# Patient Record
Sex: MALE | Race: WHITE | Employment: FULL TIME | URBAN - NONMETROPOLITAN AREA
[De-identification: names, ages, dates, MRNs, and addresses within clinical notes are randomized per-mention and may not be internally consistent; named-entity substitution may affect disease eponyms.]

---

## 2017-02-04 ENCOUNTER — OFFICE VISIT (OUTPATIENT)
Dept: FAMILY MEDICINE CLINIC | Age: 47
End: 2017-02-04
Payer: COMMERCIAL

## 2017-02-04 VITALS
HEART RATE: 69 BPM | DIASTOLIC BLOOD PRESSURE: 70 MMHG | BODY MASS INDEX: 35.69 KG/M2 | OXYGEN SATURATION: 96 % | SYSTOLIC BLOOD PRESSURE: 124 MMHG | WEIGHT: 278 LBS | TEMPERATURE: 98.5 F

## 2017-02-04 DIAGNOSIS — R74.8 INCREASED LIVER ENZYMES: ICD-10-CM

## 2017-02-04 DIAGNOSIS — K76.0 FATTY LIVER: ICD-10-CM

## 2017-02-04 DIAGNOSIS — E11.9 TYPE 2 DIABETES MELLITUS WITHOUT COMPLICATION, WITHOUT LONG-TERM CURRENT USE OF INSULIN (HCC): ICD-10-CM

## 2017-02-04 DIAGNOSIS — F41.9 ANXIETY: Primary | ICD-10-CM

## 2017-02-04 PROCEDURE — 99214 OFFICE O/P EST MOD 30 MIN: CPT | Performed by: FAMILY MEDICINE

## 2017-02-04 ASSESSMENT — ENCOUNTER SYMPTOMS
EYES NEGATIVE: 1
ALLERGIC/IMMUNOLOGIC NEGATIVE: 1
GASTROINTESTINAL NEGATIVE: 1
RESPIRATORY NEGATIVE: 1

## 2017-03-20 ENCOUNTER — OFFICE VISIT (OUTPATIENT)
Dept: FAMILY MEDICINE CLINIC | Age: 47
End: 2017-03-20
Payer: COMMERCIAL

## 2017-03-20 VITALS
OXYGEN SATURATION: 97 % | WEIGHT: 275 LBS | BODY MASS INDEX: 35.31 KG/M2 | TEMPERATURE: 100.1 F | HEART RATE: 87 BPM | DIASTOLIC BLOOD PRESSURE: 62 MMHG | SYSTOLIC BLOOD PRESSURE: 120 MMHG

## 2017-03-20 DIAGNOSIS — R50.9 FEVER, UNSPECIFIED FEVER CAUSE: ICD-10-CM

## 2017-03-20 DIAGNOSIS — B34.9 VIRAL SYNDROME: Primary | ICD-10-CM

## 2017-03-20 LAB
RAPID INFLUENZA  B AGN: NEGATIVE
RAPID INFLUENZA A AGN: NEGATIVE

## 2017-03-20 PROCEDURE — 99213 OFFICE O/P EST LOW 20 MIN: CPT | Performed by: FAMILY MEDICINE

## 2017-03-20 RX ORDER — AZITHROMYCIN 250 MG/1
TABLET, FILM COATED ORAL
Qty: 1 PACKET | Refills: 0 | Status: SHIPPED | OUTPATIENT
Start: 2017-03-20 | End: 2017-03-30

## 2017-03-20 ASSESSMENT — ENCOUNTER SYMPTOMS
ALLERGIC/IMMUNOLOGIC NEGATIVE: 1
EYES NEGATIVE: 1
GASTROINTESTINAL NEGATIVE: 1
VOMITING: 0
SORE THROAT: 1
RESPIRATORY NEGATIVE: 1
NAUSEA: 0

## 2017-04-25 DIAGNOSIS — E11.9 TYPE 2 DIABETES MELLITUS WITHOUT COMPLICATION, WITHOUT LONG-TERM CURRENT USE OF INSULIN (HCC): ICD-10-CM

## 2017-08-15 ENCOUNTER — TELEPHONE (OUTPATIENT)
Dept: FAMILY MEDICINE CLINIC | Age: 47
End: 2017-08-15

## 2017-10-06 DIAGNOSIS — E11.9 DIABETES MELLITUS (HCC): ICD-10-CM

## 2017-10-07 RX ORDER — BLOOD-GLUCOSE METER
KIT MISCELLANEOUS
Qty: 100 STRIP | Refills: 0 | Status: SHIPPED | OUTPATIENT
Start: 2017-10-07 | End: 2019-10-26 | Stop reason: SDUPTHER

## 2017-12-08 DIAGNOSIS — R74.8 INCREASED LIVER ENZYMES: ICD-10-CM

## 2017-12-08 DIAGNOSIS — E11.9 TYPE 2 DIABETES MELLITUS WITHOUT COMPLICATION, WITHOUT LONG-TERM CURRENT USE OF INSULIN (HCC): ICD-10-CM

## 2017-12-08 LAB
ALBUMIN SERPL-MCNC: 4.4 G/DL (ref 3.5–5.2)
ALP BLD-CCNC: 65 U/L (ref 40–130)
ALT SERPL-CCNC: 28 U/L (ref 5–41)
ANION GAP SERPL CALCULATED.3IONS-SCNC: 13 MMOL/L (ref 7–19)
AST SERPL-CCNC: 19 U/L (ref 5–40)
BILIRUB SERPL-MCNC: 0.7 MG/DL (ref 0.2–1.2)
BUN BLDV-MCNC: 12 MG/DL (ref 6–20)
CALCIUM SERPL-MCNC: 9.2 MG/DL (ref 8.6–10)
CHLORIDE BLD-SCNC: 102 MMOL/L (ref 98–111)
CHOLESTEROL, TOTAL: 177 MG/DL (ref 160–199)
CO2: 26 MMOL/L (ref 22–29)
CREAT SERPL-MCNC: 0.8 MG/DL (ref 0.5–1.2)
GFR NON-AFRICAN AMERICAN: >60
GLUCOSE BLD-MCNC: 130 MG/DL (ref 74–109)
HBA1C MFR BLD: 6.6 %
HCT VFR BLD CALC: 46.3 % (ref 42–52)
HDLC SERPL-MCNC: 37 MG/DL (ref 55–121)
HEMOGLOBIN: 15.7 G/DL (ref 14–18)
HEPATITIS C ANTIBODY INTERPRETATION: NORMAL
LDL CHOLESTEROL CALCULATED: 109 MG/DL
MCH RBC QN AUTO: 29.7 PG (ref 27–31)
MCHC RBC AUTO-ENTMCNC: 33.9 G/DL (ref 33–37)
MCV RBC AUTO: 87.7 FL (ref 80–94)
MICROALBUMIN UR-MCNC: <1.2 MG/DL (ref 0–19)
PDW BLD-RTO: 12.9 % (ref 11.5–14.5)
PLATELET # BLD: 201 K/UL (ref 130–400)
PMV BLD AUTO: 10.5 FL (ref 9.4–12.4)
POTASSIUM SERPL-SCNC: 4.2 MMOL/L (ref 3.5–5)
RBC # BLD: 5.28 M/UL (ref 4.7–6.1)
SODIUM BLD-SCNC: 141 MMOL/L (ref 136–145)
TOTAL PROTEIN: 7 G/DL (ref 6.6–8.7)
TRIGL SERPL-MCNC: 153 MG/DL (ref 0–149)
WBC # BLD: 7.4 K/UL (ref 4.8–10.8)

## 2018-01-02 ENCOUNTER — OFFICE VISIT (OUTPATIENT)
Dept: UROLOGY | Facility: CLINIC | Age: 48
End: 2018-01-02

## 2018-01-02 VITALS — HEIGHT: 74 IN | TEMPERATURE: 98.1 F | WEIGHT: 280 LBS | BODY MASS INDEX: 35.94 KG/M2

## 2018-01-02 DIAGNOSIS — N20.1 URETERAL STONE: ICD-10-CM

## 2018-01-02 DIAGNOSIS — N13.2 HYDRONEPHROSIS WITH RENAL AND URETERAL CALCULUS OBSTRUCTION: ICD-10-CM

## 2018-01-02 DIAGNOSIS — N20.0 KIDNEY STONE: Primary | ICD-10-CM

## 2018-01-02 DIAGNOSIS — R10.9 FLANK PAIN: Primary | ICD-10-CM

## 2018-01-02 LAB
BILIRUB BLD-MCNC: NEGATIVE MG/DL
CLARITY, POC: CLEAR
COLOR UR: YELLOW
GLUCOSE UR STRIP-MCNC: NEGATIVE MG/DL
KETONES UR QL: NEGATIVE
LEUKOCYTE EST, POC: NEGATIVE
NITRITE UR-MCNC: NEGATIVE MG/ML
PH UR: 6 [PH] (ref 5–8)
PROT UR STRIP-MCNC: NEGATIVE MG/DL
RBC # UR STRIP: ABNORMAL /UL
SP GR UR: 1.02 (ref 1–1.03)
UROBILINOGEN UR QL: NORMAL

## 2018-01-02 PROCEDURE — 99214 OFFICE O/P EST MOD 30 MIN: CPT | Performed by: UROLOGY

## 2018-01-02 PROCEDURE — 81003 URINALYSIS AUTO W/O SCOPE: CPT | Performed by: UROLOGY

## 2018-01-02 RX ORDER — KETOROLAC TROMETHAMINE 10 MG/1
TABLET, FILM COATED ORAL
Refills: 0 | COMMUNITY
Start: 2017-12-29 | End: 2018-07-09

## 2018-01-02 RX ORDER — TAMSULOSIN HYDROCHLORIDE 0.4 MG/1
1 CAPSULE ORAL NIGHTLY
COMMUNITY
End: 2018-07-09 | Stop reason: ALTCHOICE

## 2018-01-02 NOTE — PROGRESS NOTES
Subjective    Mr. Castañeda is 47 y.o. male    CHIEF COMPLAINT: Kidney stone    The patient comes in today emergently over the weekend he had some severe left flank pain some nausea and vomiting also symptoms of urgency and frequency he went to the ER at [facility] where he got a CT scan which I personally reviewed this shows a small 2 at most 3 mm stone in the left UVJ with left hydronephrosis no other calculi were seen    CT independent review  The CT scan of the abdomen/pelvis done without contrast is available for me to review.  Treatment recommendations require an independent review.  First I scanned the liver, spleen, and bowel pattern.  The retroperitoneum including the major vessels and lymphatic packages are briefly reviewed.  This film as been reviewed by the radiologist to determine any non urologic abnormalities that are present.  The kidneys are closely inspected for size, symmetry, contour, parenchymal thickness, perinephric reaction, presence of calcifications, and intrarenal dilation of the collecting system.  The ureters are inspected for their course, caliber, and any calcifications.  The bladder is inspected for its thickness, size, and presence of any calcifications.  This scan shows:    The right kidney appears normal on this non-contrasted CT scan.  The renal parenchymal is norml in thickness.  There are no solid masses or cysts.  There is no hydronephrosis.  There are no stones.      The left kidney appears abnormal on this non contrasted CT scan.   Normal parenchymal thickness, No renal masses, No renal cysts, mild hydronephrosis, mild hydroureter and Ureteral stone measuring 3mm    The bladder appears normal on this non-contrasted CT scan.  The bladder appears normal in thickness.  There no masses or stones seen on this exam    Since then he is been feeling better may be still little bit of pressure sensation and some frequency but otherwise no other severe pain no fevers associated with  "this etc.      History of Present Illness      The following portions of the patient's history were reviewed and updated as appropriate: allergies, current medications, past family history, past medical history, past social history, past surgical history and problem list.    Review of Systems   Constitutional: Negative for chills and fever.   Gastrointestinal: Negative for abdominal pain, anal bleeding and blood in stool.   Genitourinary: Positive for flank pain and frequency. Negative for difficulty urinating, hematuria, testicular pain and urgency.         Current Outpatient Prescriptions:   •  metFORMIN (GLUCOPHAGE) 500 MG tablet, TAKE 1 TABLET BY MOUTH TWICE A DAY WITH MEALS, Disp: , Rfl:   •  tamsulosin (FLOMAX) 0.4 MG capsule 24 hr capsule, Take 1 capsule by mouth Every Night., Disp: , Rfl:   •  ketorolac (TORADOL) 10 MG tablet, TAKE 1 TABLET BY MOUTH EVERY 6 HOURS AS NEEDED FOR PAIN, Disp: , Rfl: 0  •  sertraline (ZOLOFT) 50 MG tablet, TAKE 1 TABLET BY MOUTH DAILY, Disp: , Rfl: 3    Past Medical History:   Diagnosis Date   • Diabetes mellitus    • Kidney stone        Past Surgical History:   Procedure Laterality Date   • CHOLECYSTECTOMY         Social History     Social History   • Marital status:      Spouse name: N/A   • Number of children: N/A   • Years of education: N/A     Social History Main Topics   • Smoking status: Never Smoker   • Smokeless tobacco: Never Used   • Alcohol use No   • Drug use: None   • Sexual activity: Not Asked     Other Topics Concern   • None     Social History Narrative   • None       Family History   Problem Relation Age of Onset   • No Known Problems Father    • Cancer Mother    • Cancer Paternal Grandfather        Objective    Temp 98.1 °F (36.7 °C)  Ht 188 cm (74\")  Wt 127 kg (280 lb)  BMI 35.95 kg/m2    Physical Exam      No results found for any previous visit.    Results for orders placed or performed in visit on 01/02/18   POC Urinalysis Dipstick, Automated "   Result Value Ref Range    Color Yellow Yellow, Straw, Dark Yellow, Lizette    Clarity, UA Clear Clear    Glucose, UA Negative Negative, 1000 mg/dL (3+) mg/dL    Bilirubin Negative Negative    Ketones, UA Negative Negative    Specific Gravity  1.020 1.005 - 1.030    Blood, UA Trace (A) Negative    pH, Urine 6.0 5.0 - 8.0    Protein, POC Negative Negative mg/dL    Urobilinogen, UA Normal Normal    Leukocytes Negative Negative    Nitrite, UA Negative Negative       Assessment and Plan    Diagnoses and all orders for this visit:    Kidney stone  -     POC Urinalysis Dipstick, Automated    Ureteral stone  -     XR Abdomen KUB; Future  -     US Renal Bilateral; Future    Hydronephrosis with renal and ureteral calculus obstruction    Plan--I discussed the options the patient he still has been tamsulosin (last one so he is started taking that he does was given some pain medication for from the ER.    I suggested observation CV can pass a stone he should be able to pass it if he starts having severe pain and fever that he will let me know otherwise were seen back in to 3 weeks the KUB and ultrasound he will call sooner when necessary

## 2018-01-23 ENCOUNTER — APPOINTMENT (OUTPATIENT)
Dept: ULTRASOUND IMAGING | Facility: HOSPITAL | Age: 48
End: 2018-01-23
Attending: UROLOGY

## 2018-07-09 ENCOUNTER — OFFICE VISIT (OUTPATIENT)
Dept: UROLOGY | Facility: CLINIC | Age: 48
End: 2018-07-09

## 2018-07-09 ENCOUNTER — HOSPITAL ENCOUNTER (OUTPATIENT)
Dept: GENERAL RADIOLOGY | Facility: HOSPITAL | Age: 48
Discharge: HOME | End: 2018-07-09
Attending: UROLOGY | Admitting: UROLOGY

## 2018-07-09 DIAGNOSIS — N20.0 KIDNEY STONE: Primary | ICD-10-CM

## 2018-07-09 DIAGNOSIS — N13.2 HYDRONEPHROSIS WITH RENAL AND URETERAL CALCULUS OBSTRUCTION: ICD-10-CM

## 2018-07-09 DIAGNOSIS — N20.1 URETERAL STONE: ICD-10-CM

## 2018-07-09 LAB
BILIRUB BLD-MCNC: NEGATIVE MG/DL
CLARITY, POC: CLEAR
COLOR UR: YELLOW
GLUCOSE UR STRIP-MCNC: NEGATIVE MG/DL
KETONES UR QL: NEGATIVE
LEUKOCYTE EST, POC: NEGATIVE
NITRITE UR-MCNC: NEGATIVE MG/ML
PH UR: 5 [PH] (ref 5–8)
PROT UR STRIP-MCNC: NEGATIVE MG/DL
RBC # UR STRIP: ABNORMAL /UL
SP GR UR: 1.02 (ref 1–1.03)
UROBILINOGEN UR QL: NORMAL

## 2018-07-09 PROCEDURE — 74018 RADEX ABDOMEN 1 VIEW: CPT

## 2018-07-09 PROCEDURE — 99214 OFFICE O/P EST MOD 30 MIN: CPT | Performed by: UROLOGY

## 2018-07-09 PROCEDURE — 81001 URINALYSIS AUTO W/SCOPE: CPT | Performed by: UROLOGY

## 2018-07-09 NOTE — PATIENT INSTRUCTIONS

## 2018-07-09 NOTE — PROGRESS NOTES
Subjective    Mr. Castañeda is 47 y.o. male    CHIEF COMPLAINT: stone    Patient comes in with a recent episode of right flank pain some nausea and vomiting he went to emergency room a CT scan was obtained which I personally reviewed this again shows a very small stone at the right UVJ with mild Hydro.    He has subsequently passed a stone which he was able to collect a brought that in for this is a second stone we were unable to collect a previous 1.    I do not see any other stones in the kidneys today but he indicated that he Forgot that he had a stone disease or drinking mL was not drinking a lot of fluids sweaty so that may precipitate this was.    He is not had any significant symptoms recently of gross hematuria flank pain burning stinging urgency frequency etc.    CT independent review  The CT scan of the abdomen/pelvis done without contrast is available for me to review.  Treatment recommendations require an independent review.  First I scanned the liver, spleen, and bowel pattern.  The retroperitoneum including the major vessels and lymphatic packages are briefly reviewed.  This film as been reviewed by the radiologist to determine any non urologic abnormalities that are present.  The kidneys are closely inspected for size, symmetry, contour, parenchymal thickness, perinephric reaction, presence of calcifications, and intrarenal dilation of the collecting system.  The ureters are inspected for their course, caliber, and any calcifications.  The bladder is inspected for its thickness, size, and presence of any calcifications.  This scan shows:    The right kidney appears abnormal on this non contrasted CT scan.   Normal parenchymal thickness, No renal masses, No renal cysts, No renal stones, mild hydronephrosis and mild hydroureter very small stone at the right UVJ    The left kidney appears normal on this non-contrasted CT scan.  The renal parenchymal is norml in thickness.  There are no solid masses or  cysts.  There is no hydronephrosis.  There are no stones.      The bladder appears normal on this non-contrasted CT scan.  The bladder appears normal in thickness.  There no masses or stones seen on this exam.      History of Present Illness      The following portions of the patient's history were reviewed and updated as appropriate: allergies, current medications, past family history, past medical history, past social history, past surgical history and problem list.    Review of Systems   Constitutional: Negative.  Negative for chills and fever.   Gastrointestinal: Negative for abdominal distention, abdominal pain, anal bleeding, blood in stool and nausea.   Genitourinary: Negative for difficulty urinating, dysuria, flank pain, frequency, hematuria and urgency.   Psychiatric/Behavioral: Negative.  Negative for agitation and confusion.         Current Outpatient Prescriptions:   •  metFORMIN (GLUCOPHAGE) 500 MG tablet, TAKE 1 TABLET BY MOUTH TWICE A DAY WITH MEALS, Disp: , Rfl:   •  sertraline (ZOLOFT) 50 MG tablet, TAKE 1 TABLET BY MOUTH DAILY, Disp: , Rfl: 3    Past Medical History:   Diagnosis Date   • Diabetes mellitus (CMS/HCC)    • Kidney stone        Past Surgical History:   Procedure Laterality Date   • CHOLECYSTECTOMY         Social History     Social History   • Marital status:      Social History Main Topics   • Smoking status: Never Smoker   • Smokeless tobacco: Never Used   • Alcohol use No   • Drug use: No   • Sexual activity: Defer     Other Topics Concern   • Not on file       Family History   Problem Relation Age of Onset   • No Known Problems Father    • Cancer Mother    • Cancer Paternal Grandfather        Objective    There were no vitals taken for this visit.    Physical Exam      Office Visit on 01/02/2018   Component Date Value Ref Range Status   • Color 01/02/2018 Yellow  Yellow, Straw, Dark Yellow, Lizette Final   • Clarity, UA 01/02/2018 Clear  Clear Final   • Glucose, UA 01/02/2018  Negative  Negative, 1000 mg/dL (3+) mg/dL Final   • Bilirubin 01/02/2018 Negative  Negative Final   • Ketones, UA 01/02/2018 Negative  Negative Final   • Specific Gravity  01/02/2018 1.020  1.005 - 1.030 Final   • Blood, UA 01/02/2018 Trace* Negative Final   • pH, Urine 01/02/2018 6.0  5.0 - 8.0 Final   • Protein, POC 01/02/2018 Negative  Negative mg/dL Final   • Urobilinogen, UA 01/02/2018 Normal  Normal Final   • Leukocytes 01/02/2018 Negative  Negative Final   • Nitrite, UA 01/02/2018 Negative  Negative Final       Results for orders placed or performed in visit on 07/09/18   POC Urinalysis Dipstick, Multipro   Result Value Ref Range    Color Yellow Yellow, Straw, Dark Yellow, Lizette    Clarity, UA Clear Clear    Glucose, UA Negative Negative, 1000 mg/dL (3+) mg/dL    Bilirubin Negative Negative    Ketones, UA Negative Negative    Specific Gravity  1.025 1.005 - 1.030    Blood, UA Small (A) Negative    pH, Urine 5.0 5.0 - 8.0    Protein, POC Negative Negative mg/dL    Urobilinogen, UA Normal Normal    Nitrite, UA Negative Negative    Leukocytes Negative Negative     Microscopic Urinalysis  I inspected the urine myself based on the clinical situation including the dipstick urine. The urine is spun in a centrifuge for three minutes. The spun urine shows 3-6 rbc/hpf, none wbc/hpf, none epi/hpf, negative bacteria, negative crystals, and negative casts.     Assessment and Plan    Horacio was seen today for flank pain.    Diagnoses and all orders for this visit:    Kidney stone  -     POC Urinalysis Dipstick, Multipro  -     XR Abdomen KUB; Future    Hydronephrosis with renal and ureteral calculus obstruction    Ureteral stone    Plan--the patient is passed a stone is now asymptomatic.    Discussed x-ray findings with him and again accordingly do not see any other calculi E.    We will go ahead and send the stone that he passed off for analysis all seen back again in about 6 weeks he would like to be seen at [place]  [facility] which I think would be fine we will check a KUB then just make sure it will see anything and then hopefully will have the stone analysis back we will discuss her dietary restrictions at that point in time.    I did discuss restrictions with him now I think of answered all his questions

## 2018-07-14 ENCOUNTER — RESULTS ENCOUNTER (OUTPATIENT)
Dept: UROLOGY | Facility: CLINIC | Age: 48
End: 2018-07-14

## 2018-07-14 DIAGNOSIS — N20.0 KIDNEY STONE: ICD-10-CM

## 2018-07-18 DIAGNOSIS — N20.0 KIDNEY STONE: Primary | ICD-10-CM

## 2018-08-03 DIAGNOSIS — E11.9 TYPE 2 DIABETES MELLITUS WITHOUT COMPLICATION, WITHOUT LONG-TERM CURRENT USE OF INSULIN (HCC): ICD-10-CM

## 2018-08-03 DIAGNOSIS — F41.9 ANXIETY: ICD-10-CM

## 2018-10-17 DIAGNOSIS — F41.9 ANXIETY: ICD-10-CM

## 2018-12-03 DIAGNOSIS — E11.9 TYPE 2 DIABETES MELLITUS WITHOUT COMPLICATION, WITHOUT LONG-TERM CURRENT USE OF INSULIN (HCC): Primary | ICD-10-CM

## 2018-12-03 DIAGNOSIS — K76.0 FATTY LIVER: ICD-10-CM

## 2019-02-23 ENCOUNTER — OFFICE VISIT (OUTPATIENT)
Dept: FAMILY MEDICINE CLINIC | Age: 49
End: 2019-02-23
Payer: COMMERCIAL

## 2019-02-23 VITALS
DIASTOLIC BLOOD PRESSURE: 70 MMHG | BODY MASS INDEX: 35.69 KG/M2 | WEIGHT: 278 LBS | HEART RATE: 74 BPM | SYSTOLIC BLOOD PRESSURE: 128 MMHG | TEMPERATURE: 98.7 F | OXYGEN SATURATION: 97 %

## 2019-02-23 DIAGNOSIS — Z30.09 ENCOUNTER FOR VASECTOMY ASSESSMENT: ICD-10-CM

## 2019-02-23 DIAGNOSIS — E11.9 TYPE 2 DIABETES MELLITUS WITHOUT COMPLICATION, WITHOUT LONG-TERM CURRENT USE OF INSULIN (HCC): Primary | ICD-10-CM

## 2019-02-23 DIAGNOSIS — M25.512 ACUTE PAIN OF LEFT SHOULDER: ICD-10-CM

## 2019-02-23 DIAGNOSIS — L91.8 SKIN TAG: ICD-10-CM

## 2019-02-23 PROCEDURE — 99214 OFFICE O/P EST MOD 30 MIN: CPT | Performed by: FAMILY MEDICINE

## 2019-02-23 ASSESSMENT — PATIENT HEALTH QUESTIONNAIRE - PHQ9
SUM OF ALL RESPONSES TO PHQ QUESTIONS 1-9: 0
2. FEELING DOWN, DEPRESSED OR HOPELESS: 0
SUM OF ALL RESPONSES TO PHQ9 QUESTIONS 1 & 2: 0
1. LITTLE INTEREST OR PLEASURE IN DOING THINGS: 0
SUM OF ALL RESPONSES TO PHQ QUESTIONS 1-9: 0

## 2019-02-23 ASSESSMENT — ENCOUNTER SYMPTOMS
ALLERGIC/IMMUNOLOGIC NEGATIVE: 1
RESPIRATORY NEGATIVE: 1
EYES NEGATIVE: 1
GASTROINTESTINAL NEGATIVE: 1

## 2019-03-13 ENCOUNTER — TELEPHONE (OUTPATIENT)
Dept: FAMILY MEDICINE CLINIC | Age: 49
End: 2019-03-13

## 2019-04-02 NOTE — PROGRESS NOTES
Mr. Castañeda is 48 y.o. male    CHIEF COMPLAINT: I am here for a vasectomy consult.    HPI  The patient has been pondering the option of a vasectomy for3 years. Anatomically this is a lower genital tract issue/procedure. With regard to context of the decision, he presently has 1 child. He is . Associated/Relevant symptoms/signs include None. He voices no additional questions about birth control options.     Patient also went discuss erectile dysfunction.  He has been having difficulty about 2 years.  He does have type 2 diabetes mellitus.  He is never tried PDE 5 inhibitors.  He is noted a significant decrease in the number of nocturnal and spontaneous erections  over the last several months.      The following portions of the patient's history were reviewed and updated as appropriate: allergies, current medications, past family history, past medical history, past social history, past surgical history and problem list.      Review of Systems   Constitutional: Negative for chills and fever.   Gastrointestinal: Negative for abdominal pain, anal bleeding and blood in stool.   Genitourinary: Negative for dysuria, frequency, hematuria and urgency.           Current Outpatient Medications:   •  metFORMIN (GLUCOPHAGE) 500 MG tablet, TAKE 1 TABLET BY MOUTH TWICE A DAY WITH MEALS, Disp: , Rfl:   •  ALPRAZolam (XANAX) 2 MG tablet, Take 1 tablet by mouth 1 (One) Time for 1 dose. Bring to office for procedure, Disp: 1 tablet, Rfl: 0  •  HYDROcodone-acetaminophen (NORCO) 7.5-325 MG per tablet, Take 1 tablet by mouth Every 6 (Six) Hours As Needed for Moderate Pain  for up to 4 days., Disp: 16 tablet, Rfl: 0  •  sertraline (ZOLOFT) 50 MG tablet, TAKE 1 TABLET BY MOUTH DAILY, Disp: , Rfl: 3  •  sildenafil (REVATIO) 20 MG tablet, Take 1 tablet by mouth 1 (One) Time As Needed (See below) for up to 1 dose. Take 1-5 tablets as needed about 1-2 hours before activity, Disp: 30 tablet, Rfl: 5    Past Medical History:   Diagnosis  "Date   • Diabetes mellitus (CMS/HCC)    • Kidney stone        Past Surgical History:   Procedure Laterality Date   • CHOLECYSTECTOMY         Social History     Socioeconomic History   • Marital status:      Spouse name: Not on file   • Number of children: Not on file   • Years of education: Not on file   • Highest education level: Not on file   Tobacco Use   • Smoking status: Never Smoker   • Smokeless tobacco: Never Used   Substance and Sexual Activity   • Alcohol use: No   • Drug use: No   • Sexual activity: Defer       Family History   Problem Relation Age of Onset   • No Known Problems Father    • Cancer Mother    • Cancer Paternal Grandfather          Temp 97.5 °F (36.4 °C)   Ht 188 cm (74\")   Wt 125 kg (275 lb)   BMI 35.31 kg/m²       Physical Exam  Constitutional: Well nourished, Well developed; No apparent distress  Psychiatric: Appropriate affect; Alert and oriented  Eyes: Unremarkable  Musculoskeletal: Normal gait and station  GI: Abdomen is soft, non-tender  Respiratory: No distress; Unlabored movement; No accessory musculature needed with symmetric movements  Skin: No pallor or diaphoresis  ; Penis and testicles are normal.  The vas deferens is palpable bilaterally and appears accessible for vasectomy.  Vitals reviewed.        Data  Results for orders placed or performed in visit on 07/09/18   POC Urinalysis Dipstick, Multipro   Result Value Ref Range    Color Yellow Yellow, Straw, Dark Yellow, Lizette    Clarity, UA Clear Clear    Glucose, UA Negative Negative, 1000 mg/dL (3+) mg/dL    Bilirubin Negative Negative    Ketones, UA Negative Negative    Specific Gravity  1.025 1.005 - 1.030    Blood, UA Small (A) Negative    pH, Urine 5.0 5.0 - 8.0    Protein, POC Negative Negative mg/dL    Urobilinogen, UA Normal Normal    Nitrite, UA Negative Negative    Leukocytes Negative Negative         Imaging Results (last 7 days)     ** No results found for the last 168 hours. **       Patient's Body mass " index is 35.31 kg/m². BMI is above normal parameters. Recommendations include: educational material.        Assessment and Plan  Diagnoses and all orders for this visit:    Visit for vasectomy evaluation  -     Vasectomy; Future  -     ALPRAZolam (XANAX) 2 MG tablet; Take 1 tablet by mouth 1 (One) Time for 1 dose. Bring to office for procedure  -     Semen Analysis, Post-vasectomy - Semen, Voided; Future  -     HYDROcodone-acetaminophen (NORCO) 7.5-325 MG per tablet; Take 1 tablet by mouth Every 6 (Six) Hours As Needed for Moderate Pain  for up to 4 days.    Erectile dysfunction, unspecified erectile dysfunction type  -     sildenafil (REVATIO) 20 MG tablet; Take 1 tablet by mouth 1 (One) Time As Needed (See below) for up to 1 dose. Take 1-5 tablets as needed about 1-2 hours before activity    The patient desires vasectomy.  Risks of this procedure are discussed.  We discussed that it does take up to 6 months for a patient to clear the proximal sperm through the process of ejaculation.  It is explained that postoperative specimens are essential before consideration of birth control cessation.  Risks of bleeding, infection, sperm granuloma, testicular pain that can become prolonged such as post vasectomy neuralgia, recanalization with resumption of fertility status, testicular atrophy are included in the discussion.  The patient is made aware of other birth control options including permanent sterilization procedures for females.  It is also explained the vasectomy does not reduce the risk of sexually transmitted disease.  Discussion of the use of preprocedural benzodiazepine and postoperative opiate narcotic relief is also undertaken.  Brief addiction assessment concluded.  The patient has consented to the procedure.    The patient and I discussed the likely etiology of his impotence. We discussed diagnostic evaluation for erectile dysfunction is possible but usually does not change the management. He understands  "that goal directed therapy is probably the best approach since no matter what the etiology, a PDE 5 inhibitor is the least invasive way to manage ED. While there are risks to PDE 5 inhibitors which I discussed as described below, it was relayed to him that they probably provide the most \"natural\" erection. Other options discussed included penile injections, urethral suppositories, vacuum erection device without or without the tension ring, and penile prosthesis. The risks of headache, visual changes, hypotension, priapism, GI distress, myalgias, and the contraindication of nitrates were discussed with the patient. The patient denies a history of nitrates either in long acting or prn use form. He denies a history of MI, heart failure, or stroke in the last 6 months. I discussed with him that alpha blockers should be taken at least 4 hours apart from the PDE 5 inhibitors. He was given a prescription for sildenafil 20 mg tablets.He is told that he can take up to five of these titrated for effect but to use the lowest dose necessary.   I reminded him that facial flushing is not usual with this medication, and that it is most effective when taken on an empty stomach without alcohol. He understands that he needs to be forthright about using this medication in any medical situation, especially when giving nitrates is being considered.          (Please note that portions of this note were completed with a voice recognition program.)  Didier Grove MD  04/05/19  10:15 AM      Cc: Bessie Machado, CARLA  "

## 2019-04-05 ENCOUNTER — OFFICE VISIT (OUTPATIENT)
Dept: UROLOGY | Facility: CLINIC | Age: 49
End: 2019-04-05

## 2019-04-05 VITALS — TEMPERATURE: 97.5 F | HEIGHT: 74 IN | WEIGHT: 275 LBS | BODY MASS INDEX: 35.29 KG/M2

## 2019-04-05 DIAGNOSIS — E11.9 TYPE 2 DIABETES MELLITUS WITHOUT COMPLICATION, WITHOUT LONG-TERM CURRENT USE OF INSULIN (HCC): ICD-10-CM

## 2019-04-05 DIAGNOSIS — Z30.09 VISIT FOR VASECTOMY EVALUATION: Primary | ICD-10-CM

## 2019-04-05 DIAGNOSIS — N52.9 ERECTILE DYSFUNCTION, UNSPECIFIED ERECTILE DYSFUNCTION TYPE: ICD-10-CM

## 2019-04-05 LAB
ALBUMIN SERPL-MCNC: 4.4 G/DL (ref 3.5–5.2)
ALP BLD-CCNC: 81 U/L (ref 40–130)
ALT SERPL-CCNC: 37 U/L (ref 5–41)
ANION GAP SERPL CALCULATED.3IONS-SCNC: 13 MMOL/L (ref 7–19)
AST SERPL-CCNC: 23 U/L (ref 5–40)
BILIRUB SERPL-MCNC: 1.1 MG/DL (ref 0.2–1.2)
BILIRUBIN URINE: NEGATIVE
BLOOD, URINE: NEGATIVE
BUN BLDV-MCNC: 12 MG/DL (ref 6–20)
CALCIUM SERPL-MCNC: 9.5 MG/DL (ref 8.6–10)
CHLORIDE BLD-SCNC: 98 MMOL/L (ref 98–111)
CHOLESTEROL, TOTAL: 172 MG/DL (ref 160–199)
CLARITY: CLEAR
CO2: 27 MMOL/L (ref 22–29)
COLOR: ABNORMAL
CREAT SERPL-MCNC: 0.8 MG/DL (ref 0.5–1.2)
GFR NON-AFRICAN AMERICAN: >60
GLUCOSE BLD-MCNC: 139 MG/DL (ref 74–109)
GLUCOSE URINE: NEGATIVE MG/DL
HBA1C MFR BLD: 7.5 % (ref 4–6)
HCT VFR BLD CALC: 46.3 % (ref 42–52)
HDLC SERPL-MCNC: 37 MG/DL (ref 55–121)
HEMOGLOBIN: 15.3 G/DL (ref 14–18)
KETONES, URINE: NEGATIVE MG/DL
LDL CHOLESTEROL CALCULATED: 104 MG/DL
LEUKOCYTE ESTERASE, URINE: NEGATIVE
MCH RBC QN AUTO: 28.8 PG (ref 27–31)
MCHC RBC AUTO-ENTMCNC: 33 G/DL (ref 33–37)
MCV RBC AUTO: 87.2 FL (ref 80–94)
MICROALBUMIN UR-MCNC: <1.2 MG/DL (ref 0–19)
NITRITE, URINE: NEGATIVE
PDW BLD-RTO: 12.9 % (ref 11.5–14.5)
PH UA: 5.5 (ref 5–8)
PLATELET # BLD: 212 K/UL (ref 130–400)
PMV BLD AUTO: 10.8 FL (ref 9.4–12.4)
POTASSIUM SERPL-SCNC: 4.1 MMOL/L (ref 3.5–5)
PROTEIN UA: ABNORMAL MG/DL
RBC # BLD: 5.31 M/UL (ref 4.7–6.1)
SODIUM BLD-SCNC: 138 MMOL/L (ref 136–145)
SPECIFIC GRAVITY UA: 1.03 (ref 1–1.03)
TOTAL PROTEIN: 7.3 G/DL (ref 6.6–8.7)
TRIGL SERPL-MCNC: 153 MG/DL (ref 0–149)
UROBILINOGEN, URINE: 0.2 E.U./DL
WBC # BLD: 8.3 K/UL (ref 4.8–10.8)

## 2019-04-05 PROCEDURE — 99214 OFFICE O/P EST MOD 30 MIN: CPT | Performed by: UROLOGY

## 2019-04-05 RX ORDER — ALPRAZOLAM 2 MG/1
2 TABLET ORAL ONCE
Qty: 1 TABLET | Refills: 0 | Status: SHIPPED | OUTPATIENT
Start: 2019-04-05 | End: 2019-04-05

## 2019-04-05 RX ORDER — HYDROCODONE BITARTRATE AND ACETAMINOPHEN 7.5; 325 MG/1; MG/1
1 TABLET ORAL EVERY 6 HOURS PRN
Qty: 16 TABLET | Refills: 0 | Status: SHIPPED | OUTPATIENT
Start: 2019-04-05 | End: 2019-04-09

## 2019-04-05 RX ORDER — SILDENAFIL CITRATE 20 MG/1
20 TABLET ORAL ONCE AS NEEDED
Qty: 30 TABLET | Refills: 5 | Status: SHIPPED | OUTPATIENT
Start: 2019-04-05 | End: 2020-06-29 | Stop reason: SDUPTHER

## 2019-04-05 NOTE — PATIENT INSTRUCTIONS

## 2019-04-17 ENCOUNTER — TELEPHONE (OUTPATIENT)
Dept: UROLOGY | Facility: CLINIC | Age: 49
End: 2019-04-17

## 2019-04-17 NOTE — TELEPHONE ENCOUNTER
Pt had called /vm,  I called him back, he needs to r/s vas, but will not be able to r/s until Aug,   He will call in June with dates hes able to do the VAS.

## 2019-04-24 ENCOUNTER — OFFICE VISIT (OUTPATIENT)
Dept: FAMILY MEDICINE CLINIC | Age: 49
End: 2019-04-24
Payer: COMMERCIAL

## 2019-04-24 VITALS
TEMPERATURE: 98.4 F | DIASTOLIC BLOOD PRESSURE: 88 MMHG | HEIGHT: 73 IN | RESPIRATION RATE: 16 BRPM | HEART RATE: 74 BPM | SYSTOLIC BLOOD PRESSURE: 138 MMHG | BODY MASS INDEX: 38.17 KG/M2 | OXYGEN SATURATION: 98 % | WEIGHT: 288 LBS

## 2019-04-24 DIAGNOSIS — E11.9 TYPE 2 DIABETES MELLITUS WITHOUT COMPLICATION, WITHOUT LONG-TERM CURRENT USE OF INSULIN (HCC): Primary | ICD-10-CM

## 2019-04-24 DIAGNOSIS — I10 ESSENTIAL HYPERTENSION: ICD-10-CM

## 2019-04-24 DIAGNOSIS — E78.2 MIXED HYPERLIPIDEMIA: ICD-10-CM

## 2019-04-24 PROCEDURE — 99214 OFFICE O/P EST MOD 30 MIN: CPT | Performed by: FAMILY MEDICINE

## 2019-04-24 RX ORDER — ROSUVASTATIN CALCIUM 5 MG/1
5 TABLET, COATED ORAL DAILY
Qty: 90 TABLET | Refills: 3 | Status: SHIPPED | OUTPATIENT
Start: 2019-04-24 | End: 2019-10-26 | Stop reason: ALTCHOICE

## 2019-04-24 RX ORDER — BLOOD-GLUCOSE METER
1 KIT MISCELLANEOUS DAILY
Qty: 1 KIT | Refills: 0 | Status: SHIPPED | OUTPATIENT
Start: 2019-04-24

## 2019-04-24 ASSESSMENT — ENCOUNTER SYMPTOMS
GASTROINTESTINAL NEGATIVE: 1
ALLERGIC/IMMUNOLOGIC NEGATIVE: 1
RESPIRATORY NEGATIVE: 1
EYES NEGATIVE: 1

## 2019-04-24 NOTE — PROGRESS NOTES
Negative. Musculoskeletal: Negative. Skin: Negative. Allergic/Immunologic: Negative. Neurological: Negative. Hematological: Negative. Psychiatric/Behavioral: Negative. OBJECTIVE:    Physical Exam   Constitutional: He is oriented to person, place, and time. He appears well-developed and well-nourished. HENT:   Head: Normocephalic and atraumatic. Right Ear: External ear normal.   Left Ear: External ear normal.   Nose: Nose normal.   Mouth/Throat: Oropharynx is clear and moist.   Eyes: Pupils are equal, round, and reactive to light. Conjunctivae and EOM are normal.   Neck: Normal range of motion. Neck supple. Cardiovascular: Normal rate, regular rhythm, S1 normal, S2 normal, normal heart sounds, intact distal pulses and normal pulses. Pulmonary/Chest: Effort normal and breath sounds normal. No apnea. Abdominal: Soft. Normal appearance. Musculoskeletal: Normal range of motion. Neurological: He is alert and oriented to person, place, and time. He has normal strength and normal reflexes. Skin: Skin is warm, dry and intact. Psychiatric: He has a normal mood and affect. His speech is normal and behavior is normal. Judgment and thought content normal. Cognition and memory are normal.   Vitals reviewed. BP (!) 148/84   Pulse 74   Temp 98.4 °F (36.9 °C) (Oral)   Resp 16   Ht 6' 1\" (1.854 m)   Wt 288 lb (130.6 kg)   SpO2 98%   BMI 38.00 kg/m²      ASSESSMENT:     Diagnosis Orders   1. Type 2 diabetes mellitus without complication, without long-term current use of insulin (HCC) -not control  metFORMIN (GLUCOPHAGE) 1000 MG tablet    CBC    Comprehensive Metabolic Panel    Hemoglobin A1C    Lipid Panel    MICROALBUMIN, RANDOM URINE (W/O CREATININE)    Urinalysis    glucose monitoring kit (FREESTYLE) monitoring kit   2. Mixed hyperlipidemia -not control  rosuvastatin (CRESTOR) 5 MG tablet   3. Essential hypertension -borderline          PLAN:    1.  Blood work discussed with

## 2019-10-01 DIAGNOSIS — E11.9 TYPE 2 DIABETES MELLITUS WITHOUT COMPLICATION, WITHOUT LONG-TERM CURRENT USE OF INSULIN (HCC): ICD-10-CM

## 2019-10-01 LAB
ALBUMIN SERPL-MCNC: 4.3 G/DL (ref 3.5–5.2)
ALP BLD-CCNC: 98 U/L (ref 40–130)
ALT SERPL-CCNC: 52 U/L (ref 5–41)
ANION GAP SERPL CALCULATED.3IONS-SCNC: 16 MMOL/L (ref 7–19)
AST SERPL-CCNC: 34 U/L (ref 5–40)
BILIRUB SERPL-MCNC: 1.2 MG/DL (ref 0.2–1.2)
BILIRUBIN URINE: NEGATIVE
BLOOD, URINE: NEGATIVE
BUN BLDV-MCNC: 11 MG/DL (ref 6–20)
CALCIUM SERPL-MCNC: 9.1 MG/DL (ref 8.6–10)
CHLORIDE BLD-SCNC: 100 MMOL/L (ref 98–111)
CHOLESTEROL, TOTAL: 178 MG/DL (ref 160–199)
CLARITY: CLEAR
CO2: 22 MMOL/L (ref 22–29)
COLOR: ABNORMAL
CREAT SERPL-MCNC: 0.7 MG/DL (ref 0.5–1.2)
GFR NON-AFRICAN AMERICAN: >60
GLUCOSE BLD-MCNC: 259 MG/DL (ref 74–109)
GLUCOSE URINE: >=1000 MG/DL
HBA1C MFR BLD: 9.6 % (ref 4–6)
HCT VFR BLD CALC: 48 % (ref 42–52)
HDLC SERPL-MCNC: 38 MG/DL (ref 55–121)
HEMOGLOBIN: 16 G/DL (ref 14–18)
KETONES, URINE: NEGATIVE MG/DL
LDL CHOLESTEROL CALCULATED: 112 MG/DL
LEUKOCYTE ESTERASE, URINE: NEGATIVE
MCH RBC QN AUTO: 29.2 PG (ref 27–31)
MCHC RBC AUTO-ENTMCNC: 33.3 G/DL (ref 33–37)
MCV RBC AUTO: 87.6 FL (ref 80–94)
MICROALBUMIN UR-MCNC: 2 MG/DL (ref 0–19)
NITRITE, URINE: NEGATIVE
PDW BLD-RTO: 12.5 % (ref 11.5–14.5)
PH UA: 6 (ref 5–8)
PLATELET # BLD: 203 K/UL (ref 130–400)
PMV BLD AUTO: 11.1 FL (ref 9.4–12.4)
POTASSIUM SERPL-SCNC: 4 MMOL/L (ref 3.5–5)
PROTEIN UA: NEGATIVE MG/DL
RBC # BLD: 5.48 M/UL (ref 4.7–6.1)
SODIUM BLD-SCNC: 138 MMOL/L (ref 136–145)
SPECIFIC GRAVITY UA: 1.03 (ref 1–1.03)
TOTAL PROTEIN: 7.3 G/DL (ref 6.6–8.7)
TRIGL SERPL-MCNC: 141 MG/DL (ref 0–149)
UROBILINOGEN, URINE: 0.2 E.U./DL
WBC # BLD: 6.5 K/UL (ref 4.8–10.8)

## 2019-10-26 ENCOUNTER — OFFICE VISIT (OUTPATIENT)
Dept: FAMILY MEDICINE CLINIC | Age: 49
End: 2019-10-26
Payer: COMMERCIAL

## 2019-10-26 VITALS
DIASTOLIC BLOOD PRESSURE: 78 MMHG | HEART RATE: 65 BPM | OXYGEN SATURATION: 98 % | BODY MASS INDEX: 36.41 KG/M2 | WEIGHT: 276 LBS | SYSTOLIC BLOOD PRESSURE: 132 MMHG | TEMPERATURE: 97.7 F

## 2019-10-26 DIAGNOSIS — E78.2 MIXED HYPERLIPIDEMIA: ICD-10-CM

## 2019-10-26 DIAGNOSIS — Z28.21 REFUSED INFLUENZA VACCINE: ICD-10-CM

## 2019-10-26 DIAGNOSIS — E11.9 TYPE 2 DIABETES MELLITUS WITHOUT COMPLICATION, WITHOUT LONG-TERM CURRENT USE OF INSULIN (HCC): Primary | ICD-10-CM

## 2019-10-26 DIAGNOSIS — F41.9 ANXIETY: ICD-10-CM

## 2019-10-26 PROCEDURE — 99214 OFFICE O/P EST MOD 30 MIN: CPT | Performed by: FAMILY MEDICINE

## 2019-10-26 RX ORDER — METFORMIN HYDROCHLORIDE 500 MG/1
500 TABLET, EXTENDED RELEASE ORAL
Qty: 30 TABLET | Refills: 5 | Status: SHIPPED | OUTPATIENT
Start: 2019-10-26 | End: 2020-03-20

## 2019-10-26 ASSESSMENT — ENCOUNTER SYMPTOMS
EYES NEGATIVE: 1
ALLERGIC/IMMUNOLOGIC NEGATIVE: 1
RESPIRATORY NEGATIVE: 1
GASTROINTESTINAL NEGATIVE: 1

## 2019-11-04 ENCOUNTER — TELEPHONE (OUTPATIENT)
Dept: FAMILY MEDICINE CLINIC | Age: 49
End: 2019-11-04

## 2019-11-17 DIAGNOSIS — E11.9 TYPE 2 DIABETES MELLITUS WITHOUT COMPLICATION, WITHOUT LONG-TERM CURRENT USE OF INSULIN (HCC): ICD-10-CM

## 2019-11-18 RX ORDER — DULAGLUTIDE 0.75 MG/.5ML
INJECTION, SOLUTION SUBCUTANEOUS
Qty: 2 PEN | Refills: 5 | Status: SHIPPED | OUTPATIENT
Start: 2019-11-18 | End: 2019-12-11 | Stop reason: SDUPTHER

## 2019-12-11 DIAGNOSIS — E11.9 TYPE 2 DIABETES MELLITUS WITHOUT COMPLICATION, WITHOUT LONG-TERM CURRENT USE OF INSULIN (HCC): ICD-10-CM

## 2020-02-07 DIAGNOSIS — E11.9 TYPE 2 DIABETES MELLITUS WITHOUT COMPLICATION, WITHOUT LONG-TERM CURRENT USE OF INSULIN (HCC): ICD-10-CM

## 2020-02-07 LAB
ALBUMIN SERPL-MCNC: 4.4 G/DL (ref 3.5–5.2)
ALP BLD-CCNC: 84 U/L (ref 40–130)
ALT SERPL-CCNC: 22 U/L (ref 5–41)
ANION GAP SERPL CALCULATED.3IONS-SCNC: 15 MMOL/L (ref 7–19)
AST SERPL-CCNC: 17 U/L (ref 5–40)
BILIRUB SERPL-MCNC: 0.7 MG/DL (ref 0.2–1.2)
BILIRUBIN URINE: NEGATIVE
BLOOD, URINE: NEGATIVE
BUN BLDV-MCNC: 12 MG/DL (ref 6–20)
CALCIUM SERPL-MCNC: 9.3 MG/DL (ref 8.6–10)
CHLORIDE BLD-SCNC: 100 MMOL/L (ref 98–111)
CHOLESTEROL, TOTAL: 164 MG/DL (ref 160–199)
CLARITY: CLEAR
CO2: 25 MMOL/L (ref 22–29)
COLOR: YELLOW
CREAT SERPL-MCNC: 0.8 MG/DL (ref 0.5–1.2)
GFR NON-AFRICAN AMERICAN: >60
GLUCOSE BLD-MCNC: 144 MG/DL (ref 74–109)
GLUCOSE URINE: NEGATIVE MG/DL
HBA1C MFR BLD: 7.1 % (ref 4–6)
HCT VFR BLD CALC: 49 % (ref 42–52)
HDLC SERPL-MCNC: 41 MG/DL (ref 55–121)
HEMOGLOBIN: 16.2 G/DL (ref 14–18)
KETONES, URINE: NEGATIVE MG/DL
LDL CHOLESTEROL CALCULATED: 103 MG/DL
LEUKOCYTE ESTERASE, URINE: NEGATIVE
MCH RBC QN AUTO: 28.2 PG (ref 27–31)
MCHC RBC AUTO-ENTMCNC: 33.1 G/DL (ref 33–37)
MCV RBC AUTO: 85.4 FL (ref 80–94)
MICROALBUMIN UR-MCNC: <1.2 MG/DL (ref 0–19)
NITRITE, URINE: NEGATIVE
PDW BLD-RTO: 12.7 % (ref 11.5–14.5)
PH UA: 6 (ref 5–8)
PLATELET # BLD: 216 K/UL (ref 130–400)
PMV BLD AUTO: 10.9 FL (ref 9.4–12.4)
POTASSIUM SERPL-SCNC: 3.8 MMOL/L (ref 3.5–5)
PROTEIN UA: NEGATIVE MG/DL
RBC # BLD: 5.74 M/UL (ref 4.7–6.1)
SODIUM BLD-SCNC: 140 MMOL/L (ref 136–145)
SPECIFIC GRAVITY UA: 1.02 (ref 1–1.03)
TOTAL PROTEIN: 7.2 G/DL (ref 6.6–8.7)
TRIGL SERPL-MCNC: 98 MG/DL (ref 0–149)
UROBILINOGEN, URINE: 0.2 E.U./DL
WBC # BLD: 6.5 K/UL (ref 4.8–10.8)

## 2020-03-20 RX ORDER — METFORMIN HYDROCHLORIDE 500 MG/1
TABLET, EXTENDED RELEASE ORAL
Qty: 90 TABLET | Refills: 1 | Status: SHIPPED | OUTPATIENT
Start: 2020-03-20 | End: 2020-09-28

## 2020-05-27 ENCOUNTER — TELEPHONE (OUTPATIENT)
Dept: GASTROENTEROLOGY | Facility: CLINIC | Age: 50
End: 2020-05-27

## 2020-05-29 ENCOUNTER — PREP FOR SURGERY (OUTPATIENT)
Dept: OTHER | Facility: HOSPITAL | Age: 50
End: 2020-05-29

## 2020-05-29 DIAGNOSIS — Z80.0 FAMILY HISTORY OF COLORECTAL CANCER: Primary | ICD-10-CM

## 2020-06-17 PROBLEM — Z80.0 FAMILY HISTORY OF COLORECTAL CANCER: Status: ACTIVE | Noted: 2020-06-17

## 2020-06-29 ENCOUNTER — TELEPHONE (OUTPATIENT)
Dept: UROLOGY | Facility: CLINIC | Age: 50
End: 2020-06-29

## 2020-06-29 DIAGNOSIS — N52.9 ERECTILE DYSFUNCTION, UNSPECIFIED ERECTILE DYSFUNCTION TYPE: ICD-10-CM

## 2020-06-29 RX ORDER — SILDENAFIL CITRATE 20 MG/1
20 TABLET ORAL ONCE AS NEEDED
Qty: 30 TABLET | Refills: 5 | Status: SHIPPED | OUTPATIENT
Start: 2020-06-29 | End: 2020-10-23

## 2020-06-29 NOTE — TELEPHONE ENCOUNTER
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 You did see him 4/2019 for a VAS evaluation and E.D. Please advise?    ----- Message -----  From: Fatuma Ruiz  Sent: 6/29/2020  11:34 AM CDT  To: Graciela Tom MA  Subject: [facility] med refill                                This pt called wanting a med refill he hasn't been here in over a year so i'll call and get him scheduled for an apt it's for his ed meds should I keep him with [facility] or move him to [place]?

## 2020-06-29 NOTE — TELEPHONE ENCOUNTER
The patient requested refill on his sildenafil 20 mg tablets.  I went ahead and refill these and sent them to his pharmacy.   Didier Grove MD  6/29/2020  16:44

## 2020-06-30 NOTE — TELEPHONE ENCOUNTER
Tried to call pt to notify him that his refill had been sent to [facility] in [place] but mailbox is full and unable to leave message.

## 2020-08-10 ENCOUNTER — TELEPHONE (OUTPATIENT)
Dept: GASTROENTEROLOGY | Facility: CLINIC | Age: 50
End: 2020-08-10

## 2020-08-10 NOTE — TELEPHONE ENCOUNTER
PT called and wanted to reschedule his procedure after the first of the year.  I told him I didn't have the schedule made out for next year yet.   I told PT we could put him in recall for January to be notified.    PT isn't having any issues.  Work is really busy.

## 2020-08-31 ENCOUNTER — VIRTUAL VISIT (OUTPATIENT)
Dept: FAMILY MEDICINE CLINIC | Age: 50
End: 2020-08-31
Payer: COMMERCIAL

## 2020-08-31 PROCEDURE — 99214 OFFICE O/P EST MOD 30 MIN: CPT | Performed by: FAMILY MEDICINE

## 2020-08-31 PROCEDURE — 3051F HG A1C>EQUAL 7.0%<8.0%: CPT | Performed by: FAMILY MEDICINE

## 2020-08-31 RX ORDER — SYRING-NEEDL,DISP,INSUL,0.3 ML 30 GX5/16"
1 SYRINGE, EMPTY DISPOSABLE MISCELLANEOUS ONCE
Qty: 100 DEVICE | Refills: 0 | Status: SHIPPED | OUTPATIENT
Start: 2020-08-31 | End: 2020-08-31

## 2020-08-31 RX ORDER — BLOOD-GLUCOSE METER
1 KIT MISCELLANEOUS DAILY
Qty: 1 KIT | Refills: 0 | Status: SHIPPED | OUTPATIENT
Start: 2020-08-31 | End: 2021-05-14 | Stop reason: SDUPTHER

## 2020-08-31 ASSESSMENT — ENCOUNTER SYMPTOMS
EYES NEGATIVE: 1
RESPIRATORY NEGATIVE: 1
ALLERGIC/IMMUNOLOGIC NEGATIVE: 1
GASTROINTESTINAL NEGATIVE: 1

## 2020-08-31 NOTE — PROGRESS NOTES
2020    TELEHEALTH EVALUATION -- Audio/Visual (During MCS- public health emergency)    HPI: Patient here for follow-up of multiple medical problems  Patient is a 50-year-old white male. He is on oral medications and Trulicity. His metformin was decreased last time he was here. Decreasing the dose of metformin improve his diarrhea. He have not been checking his blood sugars because he does not have a glucometer no more. No weight loss with a GLP-1. He does not take statin therapy. His LDL slightly elevated when the last blood work. He will be willing to try statin again. He stopped taking the medication in the past secondary to cost.  He also complained of right elbow pain. He has been having this for a while now. Pain is localized to the right elbow in the lateral sides. He used to play baseball. Denies any injuries. He also was sick last February. He has symptoms consistent with COVID-19. He works all around a lot of people. He want to get tested for COVID-19 antibiotics. Huey Funes (:  1970) has requested an audio/video evaluation for the following concern(s):    Follow-up in diabetes hyperlipidemia evaluation of right elbow pain and concerns about COVID-19 exposure    Review of Systems   Constitutional: Negative. HENT: Negative. Eyes: Negative. Respiratory: Negative. Cardiovascular: Negative. Gastrointestinal: Negative. Endocrine: Negative. Genitourinary: Negative. Musculoskeletal: Positive for arthralgias. Skin: Negative. Allergic/Immunologic: Negative. Neurological: Negative. Hematological: Negative. Psychiatric/Behavioral: Negative. Prior to Visit Medications    Medication Sig Taking?  Authorizing Provider   glucose monitoring kit (FREESTYLE) monitoring kit 1 kit by Does not apply route daily Yes Santiago Lao MD   Lancet Device MISC 1 Device by Does not apply route once for 1 dose Yes Santiago Lao MD   blood glucose test strips (ASCENSIA AUTODISC VI;ONE TOUCH ULTRA TEST VI) strip 1 each by In Vitro route daily As needed. Yes Mason Thornton MD   diclofenac sodium (VOLTAREN) 1 % GEL Apply 2 g topically 2 times daily Yes Mason Thornton MD   metFORMIN (GLUCOPHAGE-XR) 500 MG extended release tablet TAKE 1 TABLET BY MOUTH [address] Yes Mason Thornton MD   Dulaglutide (TRULICITY) 2.50 YL/6.0MS SOPN INJECT 0.75 MG INTO THE SKIN ONCE A WEEK Yes Mason Thornton MD   blood glucose test strips (FREESTYLE LITE) strip USE AS DIRECTED Yes Mason Thornton MD   Dulaglutide (TRULICITY) 1.5 PU/1.3WS SOPN Inject 1.5 mg into the skin once a week Yes Mason Thornton MD   glucose monitoring kit (FREESTYLE) monitoring kit 1 kit by Does not apply route daily DX: Diabetes  Dispense lancet and supplies  Mason Thornton MD   Lancets MISC 1 each by Does not apply route daily Dispense brand per insurance benefits and patient request.  DEVIN Noble   glucose monitoring kit (FREESTYLE) monitoring kit 1 kit by Does not apply route daily as needed DX: Diabetes  DEVIN Noble   azelastine (ASTELIN) 0.1 % nasal spray 2 sprays by Nasal route 2 times daily Use in each nostril as directed  DEVIN Noble   Multiple Vitamin (MULTIVITAMINS PO) Take 1 tablet by mouth daily. Historical Provider, MD       Social History     Tobacco Use    Smoking status: Never Smoker    Smokeless tobacco: Never Used   Substance Use Topics    Alcohol use: No    Drug use: No            PHYSICAL EXAMINATION:  [ INSTRUCTIONS:  \"[x]\" Indicates a positive item  \"[]\" Indicates a negative item  -- DELETE ALL ITEMS NOT EXAMINED]  Vital Signs: (As obtained by patient/caregiver or practitioner observation)    Blood pressure-  Heart rate-    Respiratory rate-    Temperature-  Pulse oximetry-   Unable to obtain vital signs.   Constitutional: [x] Appears well-developed and well-nourished [] No apparent distress      [] Abnormal-   Mental status  [x] Alert and awake  [] Oriented to person/place/time []Able to follow commands      Eyes:  EOM    [x]  Normal  [] Abnormal-  Sclera  []  Normal  [] Abnormal -         Discharge []  None visible  [] Abnormal -    HENT:   [x] Normocephalic, atraumatic. [] Abnormal   [] Mouth/Throat: Mucous membranes are moist.     External Ears [x] Normal  [] Abnormal-     Neck: [x] No visualized mass     Pulmonary/Chest: [x] Respiratory effort normal.  [] No visualized signs of difficulty breathing or respiratory distress        [] Abnormal-      Musculoskeletal:   [x] Normal gait with no signs of ataxia         [] Normal range of motion of neck        [] Abnormal-       Neurological:        [x] No Facial Asymmetry (Cranial nerve 7 motor function) (limited exam to video visit)          [] No gaze palsy        [] Abnormal-         Skin:        [x] No significant exanthematous lesions or discoloration noted on facial skin         [] Abnormal-            Psychiatric:       [x] Normal Affect [] No Hallucinations        [] Abnormal-     Other pertinent observable physical exam findings-     ASSESSMENT/PLAN:  1. Type 2 diabetes mellitus without complication, without long-term current use of insulin ([facility] [address]- need blood work  - CBC; Future  - Comprehensive Metabolic Panel; Future  - Hemoglobin A1C; Future  - Lipid Panel; Future  - MICROALBUMIN, RANDOM URINE (W/O CREATININE); Future  - Urinalysis; Future  - glucose monitoring kit (FREESTYLE) monitoring kit; 1 kit by Does not apply route daily  Dispense: 1 kit; Refill: 0  - Lancet Device MISC; 1 Device by Does not apply route once for 1 dose  Dispense: 100 Device; Refill: 0  - blood glucose test strips (ASCENSIA AUTODISC VI;ONE TOUCH ULTRA TEST VI) strip; 1 each by In Vitro route daily As needed. Dispense: 100 each; Refill: 3    2. Mixed hyperlipidemia  -May need statin therapy    3. Right elbow pain-suspect tennis elbow  - diclofenac sodium (VOLTAREN) 1 % GEL; Apply 2 g topically 2 times daily  Dispense: 1 Tube; Refill: 3    4.  Exposure to COVID-19 virus  - Covid-19, Antibody, Total; Future      Return in about 3 months (around 11/30/2020). Nito Prieto is a 47 y.o. male being evaluated by a Virtual Visit (video visit) encounter to address concerns as mentioned above. A caregiver was present when appropriate. Due to this being a TeleHealth encounter (During KZE-98 public health emergency), evaluation of the following organ systems was limited: Vitals/Constitutional/EENT/Resp/CV/GI//MS/Neuro/Skin/Heme-Lymph-Imm. Pursuant to the emergency declaration under the [address] [place] [address], [address] [place] [address] authority and the [employer] Act, this Virtual Visit was conducted with patient's (and/or legal guardian's) consent, to reduce the patient's risk of exposure to COVID-19 and provide necessary medical care. The patient (and/or legal guardian) has also been advised to contact this office for worsening conditions or problems, and seek emergency medical treatment and/or call 911 if deemed necessary. Patient identification was verified at the start of the visit: Yes    Total time spent on this encounter: 22    Services were provided through a video synchronous discussion virtually to substitute for in-person clinic visit. Patient and provider were located at their individual homes. --Javier Murphy MD on 8/31/2020 at 9:56 AM    An electronic signature was used to authenticate this note.

## 2020-09-04 DIAGNOSIS — E11.9 TYPE 2 DIABETES MELLITUS WITHOUT COMPLICATION, WITHOUT LONG-TERM CURRENT USE OF INSULIN (HCC): ICD-10-CM

## 2020-09-04 DIAGNOSIS — Z20.822 EXPOSURE TO COVID-19 VIRUS: ICD-10-CM

## 2020-09-04 LAB
ALBUMIN SERPL-MCNC: 4.4 G/DL (ref 3.5–5.2)
ALP BLD-CCNC: 81 U/L (ref 40–130)
ALT SERPL-CCNC: 50 U/L (ref 5–41)
ANION GAP SERPL CALCULATED.3IONS-SCNC: 13 MMOL/L (ref 7–19)
AST SERPL-CCNC: 27 U/L (ref 5–40)
BILIRUB SERPL-MCNC: 0.9 MG/DL (ref 0.2–1.2)
BILIRUBIN URINE: NEGATIVE
BLOOD, URINE: NEGATIVE
BUN BLDV-MCNC: 11 MG/DL (ref 6–20)
CALCIUM SERPL-MCNC: 9.6 MG/DL (ref 8.6–10)
CHLORIDE BLD-SCNC: 100 MMOL/L (ref 98–111)
CHOLESTEROL, TOTAL: 173 MG/DL (ref 160–199)
CLARITY: CLEAR
CO2: 28 MMOL/L (ref 22–29)
COLOR: NORMAL
CREAT SERPL-MCNC: 0.8 MG/DL (ref 0.5–1.2)
GFR AFRICAN AMERICAN: >59
GFR NON-AFRICAN AMERICAN: >60
GLUCOSE BLD-MCNC: 178 MG/DL (ref 74–109)
GLUCOSE URINE: NEGATIVE MG/DL
HBA1C MFR BLD: 7.7 % (ref 4–6)
HCT VFR BLD CALC: 49.3 % (ref 42–52)
HDLC SERPL-MCNC: 35 MG/DL (ref 55–121)
HEMOGLOBIN: 16.4 G/DL (ref 14–18)
KETONES, URINE: NEGATIVE MG/DL
LDL CHOLESTEROL CALCULATED: 104 MG/DL
LEUKOCYTE ESTERASE, URINE: NEGATIVE
MCH RBC QN AUTO: 29.3 PG (ref 27–31)
MCHC RBC AUTO-ENTMCNC: 33.3 G/DL (ref 33–37)
MCV RBC AUTO: 88.2 FL (ref 80–94)
MICROALBUMIN UR-MCNC: <1.2 MG/DL (ref 0–19)
NITRITE, URINE: NEGATIVE
PDW BLD-RTO: 13.1 % (ref 11.5–14.5)
PH UA: 6.5 (ref 5–8)
PLATELET # BLD: 205 K/UL (ref 130–400)
PMV BLD AUTO: 10.7 FL (ref 9.4–12.4)
POTASSIUM SERPL-SCNC: 4.2 MMOL/L (ref 3.5–5)
PROTEIN UA: NEGATIVE MG/DL
RBC # BLD: 5.59 M/UL (ref 4.7–6.1)
SODIUM BLD-SCNC: 141 MMOL/L (ref 136–145)
SPECIFIC GRAVITY UA: 1.02 (ref 1–1.03)
TOTAL PROTEIN: 7.1 G/DL (ref 6.6–8.7)
TRIGL SERPL-MCNC: 169 MG/DL (ref 0–149)
UROBILINOGEN, URINE: 0.2 E.U./DL
WBC # BLD: 7.2 K/UL (ref 4.8–10.8)

## 2020-09-28 RX ORDER — METFORMIN HYDROCHLORIDE 500 MG/1
TABLET, EXTENDED RELEASE ORAL
Qty: 90 TABLET | Refills: 1 | Status: SHIPPED | OUTPATIENT
Start: 2020-09-28 | End: 2021-04-06

## 2020-10-23 DIAGNOSIS — N52.9 ERECTILE DYSFUNCTION, UNSPECIFIED ERECTILE DYSFUNCTION TYPE: ICD-10-CM

## 2020-10-23 RX ORDER — SILDENAFIL CITRATE 20 MG/1
TABLET ORAL
Qty: 90 TABLET | Refills: 5 | Status: SHIPPED | OUTPATIENT
Start: 2020-10-23

## 2020-10-23 NOTE — TELEPHONE ENCOUNTER
Requested Prescriptions     Pending Prescriptions Disp Refills   • sildenafil (REVATIO) 20 MG tablet [Pharmacy Med Name: SILDENAFIL 20 MG TABLET] 90 tablet 1     Sig: TAKE 1-5 TABLETS AS NEEDED ABOUT 1-2 HOURS BEFORE ACTIVITY

## 2020-12-10 ENCOUNTER — TELEPHONE (OUTPATIENT)
Dept: GASTROENTEROLOGY | Facility: CLINIC | Age: 50
End: 2020-12-10

## 2021-01-01 DIAGNOSIS — E11.9 TYPE 2 DIABETES MELLITUS WITHOUT COMPLICATION, WITHOUT LONG-TERM CURRENT USE OF INSULIN (HCC): ICD-10-CM

## 2021-01-04 RX ORDER — DULAGLUTIDE 0.75 MG/.5ML
INJECTION, SOLUTION SUBCUTANEOUS
Qty: 2 PEN | Refills: 1 | Status: SHIPPED | OUTPATIENT
Start: 2021-01-04 | End: 2021-03-08

## 2021-03-02 ENCOUNTER — TRANSCRIBE ORDERS (OUTPATIENT)
Dept: LAB | Facility: HOSPITAL | Age: 51
End: 2021-03-02

## 2021-03-02 DIAGNOSIS — Z01.818 PREOP TESTING: Primary | ICD-10-CM

## 2021-03-05 ENCOUNTER — LAB (OUTPATIENT)
Dept: LAB | Facility: HOSPITAL | Age: 51
End: 2021-03-05

## 2021-03-05 PROCEDURE — U0004 COV-19 TEST NON-CDC HGH THRU: HCPCS | Performed by: INTERNAL MEDICINE

## 2021-03-05 PROCEDURE — C9803 HOPD COVID-19 SPEC COLLECT: HCPCS | Performed by: INTERNAL MEDICINE

## 2021-03-07 DIAGNOSIS — E11.9 TYPE 2 DIABETES MELLITUS WITHOUT COMPLICATION, WITHOUT LONG-TERM CURRENT USE OF INSULIN (HCC): ICD-10-CM

## 2021-03-07 LAB — SARS-COV-2 ORF1AB RESP QL NAA+PROBE: NOT DETECTED

## 2021-03-08 ENCOUNTER — ANESTHESIA (OUTPATIENT)
Dept: GASTROENTEROLOGY | Facility: HOSPITAL | Age: 51
End: 2021-03-08

## 2021-03-08 ENCOUNTER — HOSPITAL ENCOUNTER (OUTPATIENT)
Facility: HOSPITAL | Age: 51
Setting detail: HOSPITAL OUTPATIENT SURGERY
Discharge: HOME | End: 2021-03-08
Attending: INTERNAL MEDICINE | Admitting: INTERNAL MEDICINE

## 2021-03-08 ENCOUNTER — ANESTHESIA EVENT (OUTPATIENT)
Dept: GASTROENTEROLOGY | Facility: HOSPITAL | Age: 51
End: 2021-03-08

## 2021-03-08 VITALS
BODY MASS INDEX: 35.42 KG/M2 | HEIGHT: 74 IN | DIASTOLIC BLOOD PRESSURE: 86 MMHG | OXYGEN SATURATION: 97 % | WEIGHT: 276 LBS | TEMPERATURE: 97.1 F | SYSTOLIC BLOOD PRESSURE: 135 MMHG | HEART RATE: 71 BPM | RESPIRATION RATE: 14 BRPM

## 2021-03-08 DIAGNOSIS — Z80.0 FAMILY HISTORY OF COLORECTAL CANCER: ICD-10-CM

## 2021-03-08 PROCEDURE — 88305 TISSUE EXAM BY PATHOLOGIST: CPT | Performed by: INTERNAL MEDICINE

## 2021-03-08 PROCEDURE — 25010000002 PROPOFOL 10 MG/ML EMULSION: Performed by: NURSE ANESTHETIST, CERTIFIED REGISTERED

## 2021-03-08 PROCEDURE — 45385 COLONOSCOPY W/LESION REMOVAL: CPT | Performed by: INTERNAL MEDICINE

## 2021-03-08 RX ORDER — ONDANSETRON 2 MG/ML
4 INJECTION INTRAMUSCULAR; INTRAVENOUS ONCE AS NEEDED
Status: DISCONTINUED | OUTPATIENT
Start: 2021-03-08 | End: 2021-03-08 | Stop reason: HOSPADM

## 2021-03-08 RX ORDER — SODIUM CHLORIDE 9 MG/ML
500 INJECTION, SOLUTION INTRAVENOUS CONTINUOUS PRN
Status: DISCONTINUED | OUTPATIENT
Start: 2021-03-08 | End: 2021-03-08 | Stop reason: HOSPADM

## 2021-03-08 RX ORDER — PROPOFOL 10 MG/ML
VIAL (ML) INTRAVENOUS AS NEEDED
Status: DISCONTINUED | OUTPATIENT
Start: 2021-03-08 | End: 2021-03-08 | Stop reason: SURG

## 2021-03-08 RX ORDER — DULAGLUTIDE 0.75 MG/.5ML
INJECTION, SOLUTION SUBCUTANEOUS
Qty: 2 PEN | Refills: 5 | Status: SHIPPED | OUTPATIENT
Start: 2021-03-08 | End: 2021-09-13

## 2021-03-08 RX ORDER — SODIUM CHLORIDE 0.9 % (FLUSH) 0.9 %
10 SYRINGE (ML) INJECTION AS NEEDED
Status: DISCONTINUED | OUTPATIENT
Start: 2021-03-08 | End: 2021-03-08 | Stop reason: HOSPADM

## 2021-03-08 RX ADMIN — PROPOFOL 230 MG: 10 INJECTION, EMULSION INTRAVENOUS at 07:52

## 2021-03-08 RX ADMIN — SODIUM CHLORIDE 500 ML: 9 INJECTION, SOLUTION INTRAVENOUS at 07:20

## 2021-03-08 NOTE — H&P
"[facility] Gastroenterology  Pre Procedure History & Physical    Chief Complaint:   Screening    Subjective     HPI:   Screening    Past Medical History:   Past Medical History:   Diagnosis Date   • Diabetes mellitus (CMS/HCC)    • Kidney stone        Past Surgical History:  Past Surgical History:   Procedure Laterality Date   • CHOLECYSTECTOMY         Family History:  Family History   Problem Relation Age of Onset   • No Known Problems Father    • Cancer Mother    • Cancer Paternal Grandfather        Social History:   reports that he has never smoked. He has never used smokeless tobacco. He reports that he does not drink alcohol and does not use drugs.    Medications:   Prior to Admission medications    Medication Sig Start Date End Date Taking? Authorizing Provider   Dulaglutide (TRULICITY SC) Inject  under the skin into the appropriate area as directed.   Yes Vianey Sandoval MD   metFORMIN (GLUCOPHAGE) 500 MG tablet TAKE 1 TABLET BY MOUTH TWICE A DAY WITH MEALS 5/22/17   Vianey Sandoval MD   sildenafil (REVATIO) 20 MG tablet TAKE 1-5 TABLETS AS NEEDED ABOUT 1-2 HOURS BEFORE ACTIVITY 10/23/20   Didier Grove MD   sertraline (ZOLOFT) 50 MG tablet TAKE 1 TABLET BY MOUTH DAILY 12/8/17 3/8/21  Vianey Sandoval MD       Allergies:  Penicillins    ROS:    General: Weight stable  Resp: No SOA  Cardiovascular: No CP    Objective     Blood pressure 157/90, temperature 97.1 °F (36.2 °C), temperature source Temporal, resp. rate 18, height 188 cm (74\"), weight 125 kg (276 lb), SpO2 96 %.    Physical Exam   Constitutional: Pt is oriented to person, place, and in no distress.   Cardiovascular: Normal rate, regular rhythm.    Pulmonary/Chest: Effort normal. No respiratory distress.   Abdominal: Non-distended.  Psychiatric: Mood, memory, affect and judgment appear normal.     Assessment/Plan     Diagnosis:  Screening    Anticipated Surgical Procedure:  Colonoscopy    The risks, benefits, and alternatives " of this procedure have been discussed with the patient or the responsible party- the patient understands and agrees to proceed.    EMR Dragon/transcription disclaimer:  Much of this encounter note is electronic transcription/translation of spoken language to printed text.  The electronic translation of spoken language may be erroneous, or at times, nonsensical words or phrases may be inadvertently transcribed.  Although I have reviewed the note for such errors, some may still exist.

## 2021-03-08 NOTE — ANESTHESIA PREPROCEDURE EVALUATION
Anesthesia Evaluation     Patient summary reviewed   no history of anesthetic complications:  NPO Solid Status: > 8 hours  NPO Liquid Status: > 2 hours           Airway   Mallampati: II  TM distance: >3 FB  Neck ROM: full  No difficulty expected  Dental - normal exam     Pulmonary - negative pulmonary ROS   Cardiovascular   Exercise tolerance: good (4-7 METS)    (+) hypertension (no medications),       Neuro/Psych- negative ROS  GI/Hepatic/Renal/Endo    (+) obesity,   renal disease stones, diabetes mellitus,   (-) liver disease    Musculoskeletal     Abdominal    Substance History      OB/GYN          Other                        Anesthesia Plan    ASA 2     MAC     intravenous induction     Anesthetic plan, all risks, benefits, and alternatives have been provided, discussed and informed consent has been obtained with: patient.

## 2021-03-09 LAB
CYTO UR: NORMAL
LAB AP CASE REPORT: NORMAL
PATH REPORT.FINAL DX SPEC: NORMAL
PATH REPORT.GROSS SPEC: NORMAL

## 2021-04-06 DIAGNOSIS — E11.9 TYPE 2 DIABETES MELLITUS WITHOUT COMPLICATION, WITHOUT LONG-TERM CURRENT USE OF INSULIN (HCC): ICD-10-CM

## 2021-04-06 RX ORDER — METFORMIN HYDROCHLORIDE 500 MG/1
TABLET, EXTENDED RELEASE ORAL
Qty: 90 TABLET | Refills: 1 | Status: SHIPPED | OUTPATIENT
Start: 2021-04-06 | End: 2021-10-04

## 2021-05-14 DIAGNOSIS — E11.9 TYPE 2 DIABETES MELLITUS WITHOUT COMPLICATION, WITHOUT LONG-TERM CURRENT USE OF INSULIN (HCC): ICD-10-CM

## 2021-05-14 RX ORDER — BLOOD-GLUCOSE METER
1 KIT MISCELLANEOUS DAILY
Qty: 1 KIT | Refills: 0 | Status: SHIPPED | OUTPATIENT
Start: 2021-05-14

## 2021-05-14 RX ORDER — LANCETS 30 GAUGE
1 EACH MISCELLANEOUS DAILY
Qty: 100 EACH | Refills: 0 | Status: SHIPPED | OUTPATIENT
Start: 2021-05-14

## 2021-07-16 ENCOUNTER — OFFICE VISIT (OUTPATIENT)
Dept: FAMILY MEDICINE CLINIC | Age: 51
End: 2021-07-16
Payer: COMMERCIAL

## 2021-07-16 VITALS
BODY MASS INDEX: 36.58 KG/M2 | SYSTOLIC BLOOD PRESSURE: 138 MMHG | DIASTOLIC BLOOD PRESSURE: 82 MMHG | WEIGHT: 276 LBS | HEIGHT: 73 IN | OXYGEN SATURATION: 98 % | RESPIRATION RATE: 20 BRPM | TEMPERATURE: 97.6 F | HEART RATE: 73 BPM

## 2021-07-16 DIAGNOSIS — Z11.59 SCREENING FOR VIRAL DISEASE: ICD-10-CM

## 2021-07-16 DIAGNOSIS — H66.92 ACUTE LEFT OTITIS MEDIA: Primary | ICD-10-CM

## 2021-07-16 DIAGNOSIS — Z20.822 EXPOSURE TO COVID-19 VIRUS: ICD-10-CM

## 2021-07-16 LAB — SARS-COV-2, PCR: NOT DETECTED

## 2021-07-16 PROCEDURE — 99214 OFFICE O/P EST MOD 30 MIN: CPT | Performed by: NURSE PRACTITIONER

## 2021-07-16 RX ORDER — ALBUTEROL SULFATE 90 UG/1
2 AEROSOL, METERED RESPIRATORY (INHALATION) 4 TIMES DAILY PRN
Qty: 1 INHALER | Refills: 1 | Status: SHIPPED | OUTPATIENT
Start: 2021-07-16 | End: 2021-07-29 | Stop reason: SDUPTHER

## 2021-07-16 RX ORDER — AZITHROMYCIN 250 MG/1
250 TABLET, FILM COATED ORAL SEE ADMIN INSTRUCTIONS
Qty: 6 TABLET | Refills: 0 | Status: SHIPPED | OUTPATIENT
Start: 2021-07-16 | End: 2021-07-21

## 2021-07-16 RX ORDER — BENZONATATE 200 MG/1
200 CAPSULE ORAL 3 TIMES DAILY PRN
Qty: 30 CAPSULE | Refills: 0 | Status: SHIPPED | OUTPATIENT
Start: 2021-07-16 | End: 2021-07-23

## 2021-07-16 SDOH — ECONOMIC STABILITY: TRANSPORTATION INSECURITY
IN THE PAST 12 MONTHS, HAS LACK OF TRANSPORTATION KEPT YOU FROM MEETINGS, WORK, OR FROM GETTING THINGS NEEDED FOR DAILY LIVING?: NO

## 2021-07-16 SDOH — ECONOMIC STABILITY: FOOD INSECURITY: WITHIN THE PAST 12 MONTHS, YOU WORRIED THAT YOUR FOOD WOULD RUN OUT BEFORE YOU GOT MONEY TO BUY MORE.: NEVER TRUE

## 2021-07-16 SDOH — ECONOMIC STABILITY: FOOD INSECURITY: WITHIN THE PAST 12 MONTHS, THE FOOD YOU BOUGHT JUST DIDN'T LAST AND YOU DIDN'T HAVE MONEY TO GET MORE.: NEVER TRUE

## 2021-07-16 SDOH — ECONOMIC STABILITY: TRANSPORTATION INSECURITY
IN THE PAST 12 MONTHS, HAS THE LACK OF TRANSPORTATION KEPT YOU FROM MEDICAL APPOINTMENTS OR FROM GETTING MEDICATIONS?: NO

## 2021-07-16 ASSESSMENT — SOCIAL DETERMINANTS OF HEALTH (SDOH): HOW HARD IS IT FOR YOU TO PAY FOR THE VERY BASICS LIKE FOOD, HOUSING, MEDICAL CARE, AND HEATING?: NOT HARD AT ALL

## 2021-07-16 ASSESSMENT — ENCOUNTER SYMPTOMS
COUGH: 1
SHORTNESS OF BREATH: 0
VOICE CHANGE: 1
VOMITING: 0

## 2021-07-16 ASSESSMENT — PATIENT HEALTH QUESTIONNAIRE - PHQ9
SUM OF ALL RESPONSES TO PHQ QUESTIONS 1-9: 0
SUM OF ALL RESPONSES TO PHQ QUESTIONS 1-9: 0
2. FEELING DOWN, DEPRESSED OR HOPELESS: 0
1. LITTLE INTEREST OR PLEASURE IN DOING THINGS: 0
SUM OF ALL RESPONSES TO PHQ QUESTIONS 1-9: 0
SUM OF ALL RESPONSES TO PHQ9 QUESTIONS 1 & 2: 0

## 2021-07-16 NOTE — PROGRESS NOTES
Does not apply route daily Yes DEVIN Noble   metFORMIN (GLUCOPHAGE-XR) 500 MG extended release tablet TAKE 1 TABLET BY MOUTH EVERY DAY WITH BREAKFAST Yes Colton Esparza MD   TRULICITY 0.80 KN/6.0DV SOPN INJECT 0.75 MG INTO THE SKIN ONCE A WEEK Yes Colton Esparza MD   diclofenac sodium (VOLTAREN) 1 % GEL Apply 2 g topically 2 times daily Yes Colton Esparza MD   Dulaglutide (TRULICITY) 1.5 RB/1.2EM SOPN Inject 1.5 mg into the skin once a week Yes Colton Esparza MD   glucose monitoring kit (FREESTYLE) monitoring kit 1 kit by Does not apply route daily DX: Diabetes  Dispense lancet and supplies Yes Colton Esparza MD   Lancets MISC 1 each by Does not apply route daily Dispense brand per insurance benefits and patient request. Yes DEVIN Noble   glucose monitoring kit (FREESTYLE) monitoring kit 1 kit by Does not apply route daily as needed DX: Diabetes Yes DEVIN Noble   azelastine (ASTELIN) 0.1 % nasal spray 2 sprays by Nasal route 2 times daily Use in each nostril as directed Yes DEVIN Noble   Multiple Vitamin (MULTIVITAMINS PO) Take 1 tablet by mouth daily.  Yes Historical Provider, MD   Lancet Device MISC 1 Device by Does not apply route once for 1 dose  Colton Esparza MD     Allergies   Allergen Reactions    Penicillins Hives     Past Surgical History:   Procedure Laterality Date    CHOLECYSTECTOMY       Family History   Problem Relation Age of Onset    Cancer Mother         colon     Other Father         kidney stone      Social History     Socioeconomic History    Marital status:      Spouse name: Not on file    Number of children: Not on file    Years of education: Not on file    Highest education level: Not on file   Occupational History    Not on file   Tobacco Use    Smoking status: Never Smoker    Smokeless tobacco: Never Used   Substance and Sexual Activity    Alcohol use: No    Drug use: No    Sexual activity: Yes     Partners: Female   Other Topics Concern    Not on file   Social called or called with further info. ER if any sudden worsening. Work note thru Monday  Start tx today  Recheck BP--f/u with PCP r/t BP and glucose in 2wks. Total Time-36mins. Diagnosis and orders for this visit are above. Please note that this chart was generated using dragon dictationsoftware. Although every effort was made to ensure the accuracy of this automated transcription, some errors in transcription may have occurred.

## 2021-07-16 NOTE — LETTER
[facility] AT [facility]  [address] [address]  Phone: [phone]  Fax: [phone]    DEVIN Donato        July 16, 2021     Patient: Lin Moran   YOB: 1970   Date of Visit: 7/16/2021       To Whom it May Concern:    Lin Moran was seen in my clinic on 7/16/2021. He may return to work on 07/20/2021. If you have any questions or concerns, please don't hesitate to call.     Sincerely,         DEVIN Donato

## 2021-07-23 ENCOUNTER — NURSE ONLY (OUTPATIENT)
Dept: FAMILY MEDICINE CLINIC | Age: 51
End: 2021-07-23

## 2021-07-23 DIAGNOSIS — Z20.822 EXPOSURE TO COVID-19 VIRUS: Primary | ICD-10-CM

## 2021-07-23 PROCEDURE — 99999 PR OFFICE/OUTPT VISIT,PROCEDURE ONLY: CPT | Performed by: NURSE PRACTITIONER

## 2021-07-24 LAB — SARS-COV-2, PCR: NOT DETECTED

## 2021-07-29 ENCOUNTER — HOSPITAL ENCOUNTER (OUTPATIENT)
Dept: GENERAL RADIOLOGY | Age: 51
Discharge: HOME | End: 2021-07-29
Payer: COMMERCIAL

## 2021-07-29 ENCOUNTER — OFFICE VISIT (OUTPATIENT)
Dept: FAMILY MEDICINE CLINIC | Age: 51
End: 2021-07-29
Payer: COMMERCIAL

## 2021-07-29 VITALS
RESPIRATION RATE: 20 BRPM | TEMPERATURE: 98.7 F | HEART RATE: 83 BPM | SYSTOLIC BLOOD PRESSURE: 124 MMHG | OXYGEN SATURATION: 94 % | DIASTOLIC BLOOD PRESSURE: 86 MMHG

## 2021-07-29 DIAGNOSIS — R05.9 COUGH: Primary | ICD-10-CM

## 2021-07-29 DIAGNOSIS — R05.9 COUGH: ICD-10-CM

## 2021-07-29 DIAGNOSIS — Z20.822 EXPOSURE TO COVID-19 VIRUS: ICD-10-CM

## 2021-07-29 PROCEDURE — 71046 X-RAY EXAM CHEST 2 VIEWS: CPT

## 2021-07-29 PROCEDURE — 99213 OFFICE O/P EST LOW 20 MIN: CPT | Performed by: NURSE PRACTITIONER

## 2021-07-29 RX ORDER — PREDNISONE 20 MG/1
20 TABLET ORAL 2 TIMES DAILY
Qty: 10 TABLET | Refills: 0 | Status: SHIPPED | OUTPATIENT
Start: 2021-07-29 | End: 2021-08-03

## 2021-07-29 RX ORDER — ALBUTEROL SULFATE 90 UG/1
2 AEROSOL, METERED RESPIRATORY (INHALATION) 4 TIMES DAILY PRN
Qty: 1 INHALER | Refills: 1 | Status: SHIPPED | OUTPATIENT
Start: 2021-07-29 | End: 2021-09-24

## 2021-07-29 ASSESSMENT — ENCOUNTER SYMPTOMS
TROUBLE SWALLOWING: 0
VOMITING: 1
SHORTNESS OF BREATH: 1
COUGH: 1
NAUSEA: 0

## 2021-07-29 NOTE — PROGRESS NOTES
SUBJECTIVE:  Cough   Patient ID: María Gonzales is a 45 y.o. male. HPI:   HPI   Has had the cough for Wife and son had Efrain but he was tested 3 times. 3 weeks ago. Cough is a dry hard cough. Does cough at night. Mr. Jed Finn  have been to [facility] and had gotten a little bit of a cold cough and had been tested 3 times and all came up negative for Covid but then soon after that his wife and son both tested positive he continues with this cough it is a dry hard cough vomited last night from the excessive coughing and has been using an albuterol inhaler however he has misplaced it and needs another one he said the other one he thought he was 3 with it but then the cough returned  EXAMINATION: XR CHEST (2 VW) 7/29/2021 1:39 PM  HISTORY: Cough. COMPARISON: There are no correlative imaging studies for comparison. FINDINGS: PA and lateral views of the chest were obtained. The lungs  are clear and normally expanded. Heart size is normal. There is  ectasia of the thoracic aorta. No pneumothorax or pleural effusion is  identified. The osseous structures are unremarkable. IMPRESSION:  No active disease is identified. Signed by Dr Bethany Mills. 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  Authorizing Provider   albuterol sulfate HFA (VENTOLIN HFA) 108 (90 Base) MCG/ACT inhaler Inhale 2 puffs into the lungs 4 times daily as needed for Wheezing Yes DEVIN Worthy   predniSONE (DELTASONE) 20 MG tablet Take 1 tablet by mouth 2 times daily for 5 days Yes DEVIN Worthy   glucose monitoring kit (FREESTYLE) monitoring kit 1 kit by Does not apply route daily Yes DEVIN Noble   blood glucose test strips (ASCENSIA AUTODISC VI;ONE TOUCH ULTRA TEST VI) strip 1 each by In Vitro route daily Yes DEVIN Noble   Lancets MISC 1 each by Does not apply route daily Yes DEVIN Noble   metFORMIN (GLUCOPHAGE-XR) 500 MG extended release tablet TAKE 1 TABLET BY MOUTH EVERY DAY WITH BREAKFAST Yes Cass Jasso MD   TRULICITY 7.46 ZM/6.3GK SOPN INJECT 0.75 MG INTO THE SKIN ONCE A WEEK Yes Cass Jasso MD   diclofenac sodium (VOLTAREN) 1 % GEL Apply 2 g topically 2 times daily Yes Cass Jasso MD   Dulaglutide (TRULICITY) 1.5 VW/8.9KZ SOPN Inject 1.5 mg into the skin once a week Yes Cass Jasso MD   glucose monitoring kit (FREESTYLE) monitoring kit 1 kit by Does not apply route daily DX: Diabetes  Dispense lancet and supplies Yes Cass Jasso MD   Lancets MISC 1 each by Does not apply route daily Dispense brand per insurance benefits and patient request. Yes DEVIN Noble   glucose monitoring kit (FREESTYLE) monitoring kit 1 kit by Does not apply route daily as needed DX: Diabetes Yes DEVIN Noble   azelastine (ASTELIN) 0.1 % nasal spray 2 sprays by Nasal route 2 times daily Use in each nostril as directed Yes DEVIN Noble   Multiple Vitamin (MULTIVITAMINS PO) Take 1 tablet by mouth daily. Yes Historical Provider, MD   Lancet Device MISC 1 Device by Does not apply route once for 1 dose  Cass Jasso MD      Allergies   Allergen Reactions    Penicillins Hives       Review of Systems   Constitutional: Negative for fever. HENT: Negative for congestion and trouble swallowing. Respiratory: Positive for cough and shortness of breath. Gastrointestinal: Positive for vomiting. Negative for nausea. Neurological: Negative for dizziness and headaches. OBJECTIVE:    Physical Exam  Constitutional:       Appearance: He is well-developed. HENT:      Head: Normocephalic and atraumatic. Right Ear: Tympanic membrane, ear canal and external ear normal. No drainage or tenderness. No middle ear effusion. There is no impacted cerumen. Tympanic membrane is not injected or bulging.       Left Ear: Tympanic membrane, ear canal and external ear normal. No drainage or tenderness. No middle ear effusion. There is no impacted cerumen. Tympanic membrane is not injected or bulging. Nose: Nose normal. No congestion or rhinorrhea. Right Sinus: No maxillary sinus tenderness or frontal sinus tenderness. Left Sinus: No maxillary sinus tenderness or frontal sinus tenderness. Mouth/Throat:      Lips: Pink. Mouth: Mucous membranes are moist. No oral lesions. Dentition: No gum lesions. Pharynx: Oropharynx is clear. No oropharyngeal exudate, posterior oropharyngeal erythema or uvula swelling. Tonsils: No tonsillar exudate or tonsillar abscesses. Eyes:      General: Lids are normal.         Right eye: No discharge. Left eye: No discharge. Extraocular Movements: Extraocular movements intact. Conjunctiva/sclera: Conjunctivae normal.      Right eye: Right conjunctiva is not injected. No exudate. Left eye: Left conjunctiva is not injected. No exudate. Cardiovascular:      Rate and Rhythm: Normal rate and regular rhythm. Heart sounds: Normal heart sounds. No murmur heard. Pulmonary:      Effort: Pulmonary effort is normal. No respiratory distress. Breath sounds: Normal breath sounds. No decreased breath sounds, wheezing, rhonchi or rales. Abdominal:      General: Bowel sounds are normal.      Palpations: Abdomen is soft. Musculoskeletal:         General: Normal range of motion. Cervical back: Normal range of motion and neck supple. No rigidity. Normal range of motion. Right lower leg: No edema. Left lower leg: No edema. Lymphadenopathy:      Cervical: No cervical adenopathy. Right cervical: No superficial cervical adenopathy. Left cervical: No superficial cervical adenopathy. Skin:     General: Skin is warm and dry. Coloration: Skin is not pale. Neurological:      Mental Status: He is alert and oriented to person, place, and time.    Psychiatric: Attention and Perception: Attention normal.         Mood and Affect: Mood normal.         Behavior: Behavior normal. Behavior is cooperative. /86 (Site: Left Upper Arm, Position: Sitting, Cuff Size: Large Adult)   Pulse 83   Temp 98.7 °F (37.1 °C) (Temporal)   Resp 20   SpO2 94%      ASSESSMENT:      ICD-10-CM    1. Cough  R05 XR CHEST STANDARD (2 VW)     albuterol sulfate HFA (VENTOLIN HFA) 108 (90 Base) MCG/ACT inhaler     predniSONE (DELTASONE) 20 MG tablet   2. Exposure to COVID-19 virus  Z20.822 albuterol sulfate HFA (VENTOLIN HFA) 108 (90 Base) MCG/ACT inhaler       PLAN:    Kasia Gear Darnall: Cough (ongoing cough has had 3 negative Covid tests . was using an inhaler and it was helping but he lost it and now he is coughing again )    Review CXR    Diagnosis and orders for this visit are above.

## 2021-07-29 NOTE — LETTER
[facility] AT [facility]  [address] [address]  Phone: [phone]  Fax: [phone]    DEVIN Raymond        July 29, 2021     Patient: Lin Moran   YOB: 1970   Date of Visit: 7/29/2021       To Whom it May Concern:    Lin Moran was seen in my clinic on 7/29/2021. He may return to work on 7/30/2021. If you have any questions or concerns, please don't hesitate to call.     Sincerely,           DEVIN Raymond

## 2021-08-02 DIAGNOSIS — J40 BRONCHITIS: ICD-10-CM

## 2021-08-02 DIAGNOSIS — J40 BRONCHITIS: Primary | ICD-10-CM

## 2021-08-02 LAB
BASOPHILS ABSOLUTE: 0 K/UL (ref 0–0.2)
BASOPHILS RELATIVE PERCENT: 0.6 % (ref 0–1)
EOSINOPHILS ABSOLUTE: 0 K/UL (ref 0–0.6)
EOSINOPHILS RELATIVE PERCENT: 0.2 % (ref 0–5)
HCT VFR BLD CALC: 48.3 % (ref 42–52)
HEMOGLOBIN: 15.9 G/DL (ref 14–18)
IMMATURE GRANULOCYTES #: 0 K/UL
LYMPHOCYTES ABSOLUTE: 1.5 K/UL (ref 1.1–4.5)
LYMPHOCYTES RELATIVE PERCENT: 23.9 % (ref 20–40)
MCH RBC QN AUTO: 29.1 PG (ref 27–31)
MCHC RBC AUTO-ENTMCNC: 32.9 G/DL (ref 33–37)
MCV RBC AUTO: 88.5 FL (ref 80–94)
MONOCYTES ABSOLUTE: 0.9 K/UL (ref 0–0.9)
MONOCYTES RELATIVE PERCENT: 13.3 % (ref 0–10)
NEUTROPHILS ABSOLUTE: 3.9 K/UL (ref 1.5–7.5)
NEUTROPHILS RELATIVE PERCENT: 61.5 % (ref 50–65)
PDW BLD-RTO: 13.2 % (ref 11.5–14.5)
PLATELET # BLD: 159 K/UL (ref 130–400)
PMV BLD AUTO: 11.3 FL (ref 9.4–12.4)
RBC # BLD: 5.46 M/UL (ref 4.7–6.1)
WBC # BLD: 6.4 K/UL (ref 4.8–10.8)

## 2021-08-02 RX ORDER — DOXYCYCLINE HYCLATE 100 MG
100 TABLET ORAL 2 TIMES DAILY
Qty: 20 TABLET | Refills: 0 | Status: SHIPPED | OUTPATIENT
Start: 2021-08-02 | End: 2021-08-12

## 2021-08-02 NOTE — PROGRESS NOTES
Patient is contacted me regarding recent visit. Patient states he has been doing steroid as ordered and symptoms are unchanged. I have entered CBC and sent doxycycline after review of previous testing. Patient will need to follow-up with his PCP in approximately 3 to 4 days if symptoms are persisting or not improving. Patient has had multiple negative Covid swabs.

## 2021-08-05 ENCOUNTER — OFFICE VISIT (OUTPATIENT)
Dept: FAMILY MEDICINE CLINIC | Age: 51
End: 2021-08-05
Payer: COMMERCIAL

## 2021-08-05 VITALS
HEART RATE: 89 BPM | SYSTOLIC BLOOD PRESSURE: 136 MMHG | OXYGEN SATURATION: 98 % | BODY MASS INDEX: 34.57 KG/M2 | DIASTOLIC BLOOD PRESSURE: 78 MMHG | TEMPERATURE: 96.7 F | RESPIRATION RATE: 20 BRPM | WEIGHT: 262 LBS

## 2021-08-05 DIAGNOSIS — R50.9 FEVER, UNSPECIFIED FEVER CAUSE: ICD-10-CM

## 2021-08-05 DIAGNOSIS — R05.9 COUGH: ICD-10-CM

## 2021-08-05 DIAGNOSIS — R05.9 COUGH: Primary | ICD-10-CM

## 2021-08-05 LAB
ADENOVIRUS BY PCR: NOT DETECTED
BORDETELLA PARAPERTUSSIS BY PCR: NOT DETECTED
BORDETELLA PERTUSSIS BY PCR: NOT DETECTED
CHLAMYDOPHILIA PNEUMONIAE BY PCR: NOT DETECTED
CORONAVIRUS 229E BY PCR: NOT DETECTED
CORONAVIRUS HKU1 BY PCR: NOT DETECTED
CORONAVIRUS NL63 BY PCR: NOT DETECTED
CORONAVIRUS OC43 BY PCR: NOT DETECTED
HUMAN METAPNEUMOVIRUS BY PCR: NOT DETECTED
HUMAN RHINOVIRUS/ENTEROVIRUS BY PCR: NOT DETECTED
INFLUENZA A BY PCR: NOT DETECTED
INFLUENZA B BY PCR: NOT DETECTED
MYCOPLASMA PNEUMONIAE BY PCR: NOT DETECTED
PARAINFLUENZA VIRUS 1 BY PCR: NOT DETECTED
PARAINFLUENZA VIRUS 2 BY PCR: NOT DETECTED
PARAINFLUENZA VIRUS 3 BY PCR: NOT DETECTED
PARAINFLUENZA VIRUS 4 BY PCR: NOT DETECTED
RESPIRATORY SYNCYTIAL VIRUS BY PCR: NOT DETECTED
SARS-COV-2, PCR: DETECTED

## 2021-08-05 PROCEDURE — 99213 OFFICE O/P EST LOW 20 MIN: CPT | Performed by: NURSE PRACTITIONER

## 2021-08-05 RX ORDER — CEFDINIR 300 MG/1
300 CAPSULE ORAL 2 TIMES DAILY
Qty: 20 CAPSULE | Refills: 0 | Status: SHIPPED | OUTPATIENT
Start: 2021-08-05 | End: 2021-08-15

## 2021-08-05 ASSESSMENT — ENCOUNTER SYMPTOMS
DIARRHEA: 0
NAUSEA: 0
COUGH: 1
ABDOMINAL PAIN: 0
WHEEZING: 1
SHORTNESS OF BREATH: 1

## 2021-08-05 NOTE — LETTER
[facility]  [address] [address]  Phone: [phone]  Fax: [phone]    DEVIN Joyce        August 5, 2021     Patient: Andris Dakin   YOB: 1970   Date of Visit: 8/5/2021       To Whom it May Concern:    Andris Dakin was seen in my clinic on 8/5/2021. He may return to work on 8/12/2021. If you have any questions or concerns, please don't hesitate to call.     Sincerely,           DEVIN Joyce

## 2021-08-05 NOTE — PROGRESS NOTES
SUBJECTIVE:  Fever, headaches, cough   Patient ID: Gretchen Wu is a 45 y.o. male. HPI:   HPI   4 weeks of coughing and tested 3 times for COVID negative , and then family positive for COVID improved but now declining . Feels like symptoms are worsening now having  fever, cough( never went away )  wheezing. No appetite,  weight lost   Fever 100.5 & 101,   Can not breath okay no sharp pain to chest ngiht sweats. 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  Authorizing Provider   cefdinir (OMNICEF) 300 MG capsule Take 1 capsule by mouth 2 times daily for 10 days Yes DEVIN Sarmiento   doxycycline hyclate (VIBRA-TABS) 100 MG tablet Take 1 tablet by mouth 2 times daily for 10 days Yes DEVIN Noble   albuterol sulfate HFA (VENTOLIN HFA) 108 (90 Base) MCG/ACT inhaler Inhale 2 puffs into the lungs 4 times daily as needed for Wheezing Yes DEVIN Sarmiento   glucose monitoring kit (FREESTYLE) monitoring kit 1 kit by Does not apply route daily Yes DEVIN Noble   blood glucose test strips (ASCENSIA AUTODISC VI;ONE TOUCH ULTRA TEST VI) strip 1 each by In Vitro route daily Yes DEVIN Noble   Lancets MISC 1 each by Does not apply route daily Yes DEVIN Noble   metFORMIN (GLUCOPHAGE-XR) 500 MG extended release tablet TAKE 1 TABLET BY MOUTH EVERY DAY WITH BREAKFAST Yes MD ETELVINA Simmons 6.77 ZZ/2.4WQ SOPN INJECT 0.75 MG INTO THE SKIN ONCE A WEEK Yes Fan Dhaliwal MD   diclofenac sodium (VOLTAREN) 1 % GEL Apply 2 g topically 2 times daily Yes Fan Dhaliwal MD   Dulaglutide (TRULICITY) 1.5 MW/9.4UW SOPN Inject 1.5 mg into the skin once a week Yes Fan Dhaliwal MD   glucose monitoring kit (FREESTYLE) monitoring kit 1 kit by Does not apply route daily DX: Diabetes  Dispense lancet and supplies Yes Lora Childs MD   Lancets MISC 1 each by Does not apply route daily Dispense brand per insurance benefits and patient request. Yes DEVIN Noble   glucose monitoring kit (FREESTYLE) monitoring kit 1 kit by Does not apply route daily as needed DX: Diabetes Yes DEVIN Noble   azelastine (ASTELIN) 0.1 % nasal spray 2 sprays by Nasal route 2 times daily Use in each nostril as directed Yes DEVIN Noble   Multiple Vitamin (MULTIVITAMINS PO) Take 1 tablet by mouth daily. Yes Historical Provider, MD   Lancet Device MISC 1 Device by Does not apply route once for 1 dose  Lora Childs MD     Allergies   Allergen Reactions    Penicillins Hives       Review of Systems   Constitutional: Positive for fatigue and fever. Respiratory: Positive for cough, shortness of breath and wheezing. Cardiovascular: Negative for chest pain and leg swelling. Gastrointestinal: Negative for abdominal pain, diarrhea and nausea. Psychiatric/Behavioral: Positive for sleep disturbance. OBJECTIVE:    Physical Exam  Constitutional:       Appearance: He is well-developed. HENT:      Head: Normocephalic and atraumatic. Right Ear: Tympanic membrane, ear canal and external ear normal. No drainage or tenderness. No middle ear effusion. There is no impacted cerumen. Tympanic membrane is not injected or bulging. Left Ear: Tympanic membrane, ear canal and external ear normal. No drainage or tenderness. No middle ear effusion. There is no impacted cerumen. Tympanic membrane is not injected or bulging. Nose: Nose normal. No congestion or rhinorrhea. Right Sinus: No maxillary sinus tenderness or frontal sinus tenderness. Left Sinus: No maxillary sinus tenderness or frontal sinus tenderness. Mouth/Throat:      Lips: Pink. Mouth: Mucous membranes are moist. No oral lesions. Dentition: No gum lesions.       Pharynx: Oropharynx is clear. No oropharyngeal exudate, posterior oropharyngeal erythema or uvula swelling. Tonsils: No tonsillar exudate or tonsillar abscesses. Eyes:      General: Lids are normal.         Right eye: No discharge. Left eye: No discharge. Extraocular Movements: Extraocular movements intact. Conjunctiva/sclera: Conjunctivae normal.      Right eye: Right conjunctiva is not injected. No exudate. Left eye: Left conjunctiva is not injected. No exudate. Cardiovascular:      Rate and Rhythm: Normal rate and regular rhythm. Heart sounds: Normal heart sounds. No murmur heard. Pulmonary:      Effort: Pulmonary effort is normal. No respiratory distress. Breath sounds: Normal breath sounds. No decreased breath sounds, wheezing, rhonchi or rales. Abdominal:      General: Bowel sounds are normal.      Palpations: Abdomen is soft. Musculoskeletal:         General: Normal range of motion. Cervical back: Normal range of motion and neck supple. No rigidity. Normal range of motion. Right lower leg: No edema. Left lower leg: No edema. Lymphadenopathy:      Cervical: No cervical adenopathy. Right cervical: No superficial cervical adenopathy. Left cervical: No superficial cervical adenopathy. Skin:     General: Skin is warm and dry. Coloration: Skin is not pale. Neurological:      Mental Status: He is alert and oriented to person, place, and time. Psychiatric:         Attention and Perception: Attention normal.         Mood and Affect: Mood normal.         Behavior: Behavior normal. Behavior is cooperative. /78 (Site: Left Upper Arm, Position: Sitting, Cuff Size: Large Adult)   Pulse 89   Temp 96.7 °F (35.9 °C) (Temporal)   Resp 20   Wt 262 lb (118.8 kg)   SpO2 98%   BMI 34.57 kg/m²      ASSESSMENT:      ICD-10-CM    1. Cough  R05 CT CHEST WO CONTRAST     cefdinir (OMNICEF) 300 MG capsule   2.  Fever, unspecified fever cause  R50.9 Calcitonin     CBC Auto Differential     Respiratory Panel, Molecular, with COVID-19 (Restricted: peds pts or suitable admitted adults)     Comprehensive Metabolic Panel     Rapid Strep Screen     Respiratory Panel, Molecular, with COVID-19 (Restricted: peds pts or suitable admitted adults)     Rapid Strep Screen       PLAN:    Juni Cooper: Cough (cough ), Fever (last night ran fever ), Wheezing (wheezing feels short of breath ), Headache, and Sweats (feels cold then sweats )  Rapid strep negative   Work excuse given  Tylenol for fever  Push fluids. RTC for no improvement.

## 2021-08-06 ENCOUNTER — HOSPITAL ENCOUNTER (OUTPATIENT)
Dept: INFUSION THERAPY | Age: 51
Setting detail: INFUSION SERIES
Discharge: HOME | End: 2021-08-06
Payer: COMMERCIAL

## 2021-08-06 VITALS
DIASTOLIC BLOOD PRESSURE: 81 MMHG | RESPIRATION RATE: 18 BRPM | SYSTOLIC BLOOD PRESSURE: 125 MMHG | OXYGEN SATURATION: 95 % | HEART RATE: 69 BPM | TEMPERATURE: 98.3 F

## 2021-08-06 DIAGNOSIS — U07.1 COVID-19: ICD-10-CM

## 2021-08-06 DIAGNOSIS — U07.1 COVID-19: Primary | ICD-10-CM

## 2021-08-06 PROCEDURE — 96374 THER/PROPH/DIAG INJ IV PUSH: CPT

## 2021-08-06 PROCEDURE — 6360000002 HC RX W HCPCS: Performed by: NURSE PRACTITIONER

## 2021-08-06 PROCEDURE — 2580000003 HC RX 258: Performed by: NURSE PRACTITIONER

## 2021-08-06 PROCEDURE — M0243 CASIRIVI AND IMDEVI INFUSION: HCPCS

## 2021-08-06 PROCEDURE — 2500000003 HC RX 250 WO HCPCS: Performed by: NURSE PRACTITIONER

## 2021-08-06 PROCEDURE — 6370000000 HC RX 637 (ALT 250 FOR IP): Performed by: NURSE PRACTITIONER

## 2021-08-06 RX ORDER — HEPARIN SODIUM (PORCINE) LOCK FLUSH IV SOLN 100 UNIT/ML 100 UNIT/ML
500 SOLUTION INTRAVENOUS PRN
Status: CANCELLED | OUTPATIENT
Start: 2021-08-06

## 2021-08-06 RX ORDER — SODIUM CHLORIDE 0.9 % (FLUSH) 0.9 %
5-40 SYRINGE (ML) INJECTION PRN
Status: CANCELLED | OUTPATIENT
Start: 2021-08-06

## 2021-08-06 RX ORDER — DIPHENHYDRAMINE HYDROCHLORIDE 50 MG/ML
50 INJECTION INTRAMUSCULAR; INTRAVENOUS ONCE
Status: CANCELLED | OUTPATIENT
Start: 2021-08-06 | End: 2021-08-06

## 2021-08-06 RX ORDER — EPINEPHRINE 1 MG/ML
0.3 INJECTION, SOLUTION, CONCENTRATE INTRAVENOUS PRN
Status: CANCELLED | OUTPATIENT
Start: 2021-08-06

## 2021-08-06 RX ORDER — SODIUM CHLORIDE 9 MG/ML
INJECTION, SOLUTION INTRAVENOUS CONTINUOUS
Status: CANCELLED | OUTPATIENT
Start: 2021-08-06

## 2021-08-06 RX ORDER — DIPHENHYDRAMINE HYDROCHLORIDE 50 MG/ML
25 INJECTION INTRAMUSCULAR; INTRAVENOUS ONCE
Status: CANCELLED | OUTPATIENT
Start: 2021-08-06

## 2021-08-06 RX ORDER — ACETAMINOPHEN 325 MG/1
650 TABLET ORAL ONCE
Status: CANCELLED | OUTPATIENT
Start: 2021-08-06

## 2021-08-06 RX ORDER — SODIUM CHLORIDE 9 MG/ML
25 INJECTION, SOLUTION INTRAVENOUS PRN
Status: CANCELLED | OUTPATIENT
Start: 2021-08-06

## 2021-08-06 RX ORDER — METHYLPREDNISOLONE SODIUM SUCCINATE 125 MG/2ML
125 INJECTION, POWDER, LYOPHILIZED, FOR SOLUTION INTRAMUSCULAR; INTRAVENOUS ONCE
Status: CANCELLED | OUTPATIENT
Start: 2021-08-06 | End: 2021-08-06

## 2021-08-06 RX ORDER — ACETAMINOPHEN 325 MG/1
650 TABLET ORAL ONCE
Status: COMPLETED | OUTPATIENT
Start: 2021-08-06 | End: 2021-08-06

## 2021-08-06 RX ORDER — SODIUM CHLORIDE 9 MG/ML
INJECTION, SOLUTION INTRAVENOUS CONTINUOUS
Status: ACTIVE | OUTPATIENT
Start: 2021-08-06 | End: 2021-08-06

## 2021-08-06 RX ORDER — SODIUM CHLORIDE 0.9 % (FLUSH) 0.9 %
5-40 SYRINGE (ML) INJECTION PRN
Status: DISCONTINUED | OUTPATIENT
Start: 2021-08-06 | End: 2021-08-07 | Stop reason: HOSPADM

## 2021-08-06 RX ORDER — DIPHENHYDRAMINE HYDROCHLORIDE 50 MG/ML
25 INJECTION INTRAMUSCULAR; INTRAVENOUS ONCE
Status: COMPLETED | OUTPATIENT
Start: 2021-08-06 | End: 2021-08-06

## 2021-08-06 RX ADMIN — DIPHENHYDRAMINE HYDROCHLORIDE 25 MG: 50 INJECTION, SOLUTION INTRAMUSCULAR; INTRAVENOUS at 13:07

## 2021-08-06 RX ADMIN — SODIUM CHLORIDE: 9 INJECTION, SOLUTION INTRAVENOUS at 13:07

## 2021-08-06 RX ADMIN — IMDEVIMAB: 300 INJECTION, SOLUTION, CONCENTRATE INTRAVENOUS at 13:13

## 2021-08-06 RX ADMIN — ACETAMINOPHEN 650 MG: 325 TABLET ORAL at 13:08

## 2021-08-06 ASSESSMENT — PAIN SCALES - GENERAL: PAINLEVEL_OUTOF10: 0

## 2021-08-06 NOTE — PROGRESS NOTES
Patient given FACT SHEET for EMERGENCY USE AUTHORIZATION FOR CASIRIVIMAB AND IMDEVIMAB. Pt verbalizes understanding of EMERGENCY USE AUTHORIZATION. Pt verbalizes understanding: CASIRIVIMAB AND IMDEVIMAB are invesitgational because they are still being studied. Pt verbalizes understanding regarding known safety and effectiveness and understands limited data is available regarding risks and benefits. Pt understands there is a continued need to self-isolate and continue all infection control measures. Pt. chooses to proceed with monoclonal antibody infusion therapy. Pt signed a copy of this information.  Electronically signed by Sammy Rodriguez RN on 8/6/2021 at 1:09 PM

## 2021-08-06 NOTE — PROGRESS NOTES
Patient tolerated monoclonal antibody infusion therapy without s/s of adverse reaction. Patient observed one hour post observation. Discharge instructions provided. Patient verbalizes understanding of discharge instructions and advice to seek emergent medical care in the event of adverse reaction (fever, chills, changes in heartbeat, shortness of air, wheezing, swelling of lips, face or throat, rash including hives, itching, headache, nausea, vomiting, sweating, muscle aches, dizziness and shivering. Patient ambulated from infusion center to vehicle with a RN present.  Electronically signed by Sharon Puente RN on 8/6/2021 at 3:09 PM

## 2021-08-06 NOTE — PROGRESS NOTES
Latoya Lozano has contacted me in regard to patient's positive Covid. Patient has been made aware of this through [address] Related to patient's past medical history of diabetes, it is recommended that patient consider BAM treatment- IV infusion. Please contact pt regarding this as a recommendation. Set up at [facility] today.

## 2021-09-12 DIAGNOSIS — E11.9 TYPE 2 DIABETES MELLITUS WITHOUT COMPLICATION, WITHOUT LONG-TERM CURRENT USE OF INSULIN (HCC): ICD-10-CM

## 2021-09-13 RX ORDER — DULAGLUTIDE 0.75 MG/.5ML
INJECTION, SOLUTION SUBCUTANEOUS
Qty: 2 PEN | Refills: 0 | Status: SHIPPED | OUTPATIENT
Start: 2021-09-13 | End: 2021-10-18

## 2021-09-24 DIAGNOSIS — Z20.822 EXPOSURE TO COVID-19 VIRUS: ICD-10-CM

## 2021-09-24 DIAGNOSIS — R05.9 COUGH: ICD-10-CM

## 2021-09-24 RX ORDER — ALBUTEROL SULFATE 90 UG/1
2 AEROSOL, METERED RESPIRATORY (INHALATION) 4 TIMES DAILY PRN
Qty: 18 EACH | Refills: 1 | Status: SHIPPED | OUTPATIENT
Start: 2021-09-24

## 2021-09-27 DIAGNOSIS — K76.0 FATTY LIVER: ICD-10-CM

## 2021-09-27 DIAGNOSIS — E78.2 MIXED HYPERLIPIDEMIA: ICD-10-CM

## 2021-09-27 DIAGNOSIS — E11.9 TYPE 2 DIABETES MELLITUS WITHOUT COMPLICATION, WITHOUT LONG-TERM CURRENT USE OF INSULIN (HCC): Primary | ICD-10-CM

## 2021-10-03 DIAGNOSIS — E11.9 TYPE 2 DIABETES MELLITUS WITHOUT COMPLICATION, WITHOUT LONG-TERM CURRENT USE OF INSULIN (HCC): ICD-10-CM

## 2021-10-04 RX ORDER — METFORMIN HYDROCHLORIDE 500 MG/1
TABLET, EXTENDED RELEASE ORAL
Qty: 90 TABLET | Refills: 1 | Status: SHIPPED | OUTPATIENT
Start: 2021-10-04 | End: 2022-03-31

## 2021-10-17 DIAGNOSIS — E11.9 TYPE 2 DIABETES MELLITUS WITHOUT COMPLICATION, WITHOUT LONG-TERM CURRENT USE OF INSULIN (HCC): ICD-10-CM

## 2021-10-18 RX ORDER — DULAGLUTIDE 0.75 MG/.5ML
INJECTION, SOLUTION SUBCUTANEOUS
Qty: 2 PEN | Refills: 5 | Status: SHIPPED | OUTPATIENT
Start: 2021-10-18 | End: 2022-07-08

## 2022-01-06 ENCOUNTER — OFFICE VISIT (OUTPATIENT)
Dept: FAMILY MEDICINE CLINIC | Age: 52
End: 2022-01-06
Payer: COMMERCIAL

## 2022-01-06 VITALS
WEIGHT: 262 LBS | BODY MASS INDEX: 34.57 KG/M2 | DIASTOLIC BLOOD PRESSURE: 80 MMHG | HEART RATE: 83 BPM | SYSTOLIC BLOOD PRESSURE: 126 MMHG | OXYGEN SATURATION: 95 % | TEMPERATURE: 97.5 F

## 2022-01-06 DIAGNOSIS — J02.9 SORE THROAT: Primary | ICD-10-CM

## 2022-01-06 DIAGNOSIS — R05.9 COUGH: ICD-10-CM

## 2022-01-06 DIAGNOSIS — J02.9 ACUTE VIRAL PHARYNGITIS: ICD-10-CM

## 2022-01-06 LAB
S PYO AG THROAT QL: NORMAL
SARS-COV-2, PCR: NOT DETECTED

## 2022-01-06 PROCEDURE — 99213 OFFICE O/P EST LOW 20 MIN: CPT | Performed by: NURSE PRACTITIONER

## 2022-01-06 PROCEDURE — 87880 STREP A ASSAY W/OPTIC: CPT | Performed by: NURSE PRACTITIONER

## 2022-01-06 RX ORDER — AZITHROMYCIN 250 MG/1
250 TABLET, FILM COATED ORAL SEE ADMIN INSTRUCTIONS
Qty: 6 TABLET | Refills: 0 | Status: SHIPPED | OUTPATIENT
Start: 2022-01-06 | End: 2022-01-11

## 2022-01-06 ASSESSMENT — ENCOUNTER SYMPTOMS
DIARRHEA: 0
SORE THROAT: 1
SHORTNESS OF BREATH: 0
VOMITING: 0
NAUSEA: 1
COUGH: 1

## 2022-01-06 NOTE — PROGRESS NOTES
SUBJECTIVE:  Sore throat  Patient ID: Mark Lea is a 45 y.o. male. HPI:   HPI   Started yesterday with a scratchy throat and then coughing today sore throat   COVID in August   Yellow drainage. 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  Authorizing Provider   azithromycin (ZITHROMAX) 250 MG tablet Take 1 tablet by mouth See Admin Instructions for 5 days 500mg on day 1 followed by 250mg on days 2 - 5 Yes DEVIN Cruz   TRULICITY 1.01 WK/2.6ON SOPN INJECT 0.75 MG INTO THE SKIN ONCE A WEEK Yes Gregory Blake MD   metFORMIN (GLUCOPHAGE-XR) 500 MG extended release tablet TAKE 1 TABLET BY MOUTH [address] Yes Gregory Blake MD   albuterol sulfate  (90 Base) MCG/ACT inhaler INHALE 2 PUFFS INTO THE LUNGS 4 TIMES DAILY AS NEEDED FOR WHEEZING Yes DEVIN Noble   glucose monitoring kit (FREESTYLE) monitoring kit 1 kit by Does not apply route daily Yes DEVIN Noble   blood glucose test strips (ASCENSIA AUTODISC VI;ONE TOUCH ULTRA TEST VI) strip 1 each by In Vitro route daily Yes DEVIN Noble   Lancets MISC 1 each by Does not apply route daily Yes DEVIN Noble   diclofenac sodium (VOLTAREN) 1 % GEL Apply 2 g topically 2 times daily Yes Gregory Blake MD   Dulaglutide (TRULICITY) 1.5 DX/6.2LE SOPN Inject 1.5 mg into the skin once a week Yes Gregory Blake MD   glucose monitoring kit (FREESTYLE) monitoring kit 1 kit by Does not apply route daily DX: Diabetes  Dispense lancet and supplies Yes Gregory Blake MD   Lancets MISC 1 each by Does not apply route daily Dispense brand per insurance benefits and patient request. Yes DEVIN Noble   glucose monitoring kit (FREESTYLE) monitoring kit 1 kit by Does not apply route daily as needed DX: Diabetes Yes DEVIN Noble   azelastine (ASTELIN) 0.1 % nasal spray 2 sprays by Nasal route 2 times daily Use in each nostril as directed Yes DEVIN Noble   Multiple Vitamin (MULTIVITAMINS PO) Take 1 tablet by mouth daily. Yes Historical Provider, MD   Lancet Device MISC 1 Device by Does not apply route once for 1 dose  Sophie Mueller MD     Allergies   Allergen Reactions    Penicillins Hives       Review of Systems   Constitutional: Positive for fatigue (a little). Negative for fever. HENT: Positive for postnasal drip and sore throat. Negative for congestion. Respiratory: Positive for cough. Negative for shortness of breath. Gastrointestinal: Positive for nausea. Negative for diarrhea and vomiting. Neurological: Negative for headaches. OBJECTIVE:    Physical Exam  Constitutional:       Appearance: He is well-developed. HENT:      Head: Normocephalic and atraumatic. Right Ear: Tympanic membrane, ear canal and external ear normal. No drainage or tenderness. No middle ear effusion. There is no impacted cerumen. Tympanic membrane is not injected or bulging. Left Ear: Tympanic membrane, ear canal and external ear normal. No drainage or tenderness. No middle ear effusion. There is no impacted cerumen. Tympanic membrane is not injected or bulging. Nose: Nose normal. No congestion or rhinorrhea. Right Sinus: No maxillary sinus tenderness or frontal sinus tenderness. Left Sinus: No maxillary sinus tenderness or frontal sinus tenderness. Mouth/Throat:      Lips: Pink. Mouth: Mucous membranes are moist. No oral lesions. Dentition: No gum lesions. Pharynx: Oropharynx is clear. No oropharyngeal exudate, posterior oropharyngeal erythema or uvula swelling. Tonsils: No tonsillar exudate or tonsillar abscesses. Eyes:      General: Lids are normal.         Right eye: No discharge. Left eye: No discharge. Extraocular Movements: Extraocular movements intact. Conjunctiva/sclera: Conjunctivae normal.      Right eye: Right conjunctiva is not injected. No exudate. Left eye: Left conjunctiva is not injected. No exudate. Cardiovascular:      Rate and Rhythm: Normal rate and regular rhythm. Heart sounds: Normal heart sounds. No murmur heard. Pulmonary:      Effort: Pulmonary effort is normal. No respiratory distress. Breath sounds: Normal breath sounds. No decreased breath sounds, wheezing, rhonchi or rales. Abdominal:      General: Bowel sounds are normal.      Palpations: Abdomen is soft. Musculoskeletal:         General: Normal range of motion. Cervical back: Normal range of motion and neck supple. No rigidity. Normal range of motion. Right lower leg: No edema. Left lower leg: No edema. Lymphadenopathy:      Cervical: No cervical adenopathy. Right cervical: No superficial cervical adenopathy. Left cervical: No superficial cervical adenopathy. Skin:     General: Skin is warm and dry. Coloration: Skin is not pale. Neurological:      Mental Status: He is alert and oriented to person, place, and time. Psychiatric:         Attention and Perception: Attention normal.         Mood and Affect: Mood normal.         Behavior: Behavior normal. Behavior is cooperative. /80 (Site: Left Upper Arm, Position: Sitting, Cuff Size: Large Adult)   Pulse 83   Temp 97.5 °F (36.4 °C) (Temporal)   Wt 262 lb (118.8 kg)   SpO2 95%   BMI 34.57 kg/m²      ASSESSMENT:      ICD-10-CM    1. Sore throat  J02.9 POCT rapid strep A     COVID-19   2. Acute viral pharyngitis  J02.9 azithromycin (ZITHROMAX) 250 MG tablet   3. Cough  R05.9 COVID-19       PLAN:    Eugene Kayser: Cough (coughed up yellow flem this morning) and Pharyngitis (started last night)      Tylenol for fever  Push fluids. RTC for no improvement.

## 2022-01-07 ENCOUNTER — TELEPHONE (OUTPATIENT)
Dept: FAMILY MEDICINE CLINIC | Age: 52
End: 2022-01-07

## 2022-01-07 NOTE — TELEPHONE ENCOUNTER
Called pt to notify of negative covid results and to follow up if worsening symptoms or no improvement.

## 2022-01-11 ENCOUNTER — OFFICE VISIT (OUTPATIENT)
Dept: FAMILY MEDICINE CLINIC | Age: 52
End: 2022-01-11
Payer: COMMERCIAL

## 2022-01-11 VITALS — OXYGEN SATURATION: 95 % | TEMPERATURE: 97.4 F | BODY MASS INDEX: 36.15 KG/M2 | HEART RATE: 83 BPM | WEIGHT: 274 LBS

## 2022-01-11 DIAGNOSIS — N20.0 KIDNEY STONE: Primary | ICD-10-CM

## 2022-01-11 DIAGNOSIS — J34.89 POSTERIOR RHINORRHEA: ICD-10-CM

## 2022-01-11 DIAGNOSIS — H60.502 ACUTE OTITIS EXTERNA OF LEFT EAR, UNSPECIFIED TYPE: ICD-10-CM

## 2022-01-11 DIAGNOSIS — E11.65 TYPE 2 DIABETES MELLITUS WITH HYPERGLYCEMIA, WITHOUT LONG-TERM CURRENT USE OF INSULIN (HCC): ICD-10-CM

## 2022-01-11 DIAGNOSIS — R31.9 HEMATURIA, UNSPECIFIED TYPE: ICD-10-CM

## 2022-01-11 LAB
BILIRUBIN URINE: NEGATIVE
BLOOD, URINE: ABNORMAL
CLARITY: ABNORMAL
COLOR: ABNORMAL
GLUCOSE URINE: NEGATIVE MG/DL
KETONES, URINE: NEGATIVE MG/DL
LEUKOCYTE ESTERASE, URINE: NEGATIVE
NITRITE, URINE: NEGATIVE
PH UA: 5.5 (ref 5–8)
PROTEIN UA: 30 MG/DL
SPECIFIC GRAVITY UA: >=1.03 (ref 1–1.03)
URINE REFLEX TO CULTURE: NO
URINE TYPE: ABNORMAL
UROBILINOGEN, URINE: 0.2 E.U./DL

## 2022-01-11 PROCEDURE — 99214 OFFICE O/P EST MOD 30 MIN: CPT | Performed by: FAMILY MEDICINE

## 2022-01-11 PROCEDURE — 81003 URINALYSIS AUTO W/O SCOPE: CPT | Performed by: FAMILY MEDICINE

## 2022-01-11 RX ORDER — KETOROLAC TROMETHAMINE 10 MG/1
10 TABLET, FILM COATED ORAL EVERY 6 HOURS PRN
Qty: 20 TABLET | Refills: 0 | Status: SHIPPED | OUTPATIENT
Start: 2022-01-11 | End: 2022-01-16

## 2022-01-11 RX ORDER — LEVOCETIRIZINE DIHYDROCHLORIDE 5 MG/1
5 TABLET, FILM COATED ORAL NIGHTLY
Qty: 30 TABLET | Refills: 2 | Status: SHIPPED | OUTPATIENT
Start: 2022-01-11 | End: 2022-04-11

## 2022-01-11 RX ORDER — FLUTICASONE PROPIONATE 50 MCG
2 SPRAY, SUSPENSION (ML) NASAL DAILY
Qty: 16 G | Refills: 0 | Status: SHIPPED | OUTPATIENT
Start: 2022-01-11 | End: 2022-02-02

## 2022-01-11 RX ORDER — METHYLPREDNISOLONE 4 MG/1
TABLET ORAL
Qty: 1 KIT | Refills: 0 | Status: SHIPPED | OUTPATIENT
Start: 2022-01-11 | End: 2022-01-17

## 2022-01-11 NOTE — LETTER
[facility]  [address] [address]  Phone: [phone]  Fax: [phone]    Kusum Velarde MD        January 11, 2022     Patient: Khanh Lim   YOB: 1970   Date of Visit: 1/11/2022       To Whom it May Concern:    Khanh Lim was seen in my clinic on 1/11/2022. He may return to work on 1/12/2022. If you have any questions or concerns, please don't hesitate to call.     Sincerely,         Kusum Velarde MD

## 2022-01-11 NOTE — PROGRESS NOTES
[facility]  [address] [address]  Dept: [phone]  Dept Fax: [phone]    Visit type: Established patient    Reason for Visit: Hematuria         Assessment and Plan       1. Kidney stone  -     ketorolac (TORADOL) 10 MG tablet; Take 1 tablet by mouth every 6 hours as needed for Pain, Disp-20 tablet, R-0Normal  2. Hematuria, unspecified type  -     Urinalysis Reflex to Culture  -     Urinalysis Reflex to Culture; Future  3. Acute otitis externa of left ear, unspecified type  -     neomycin-polymyxin-hydrocortisone (CORTISPORIN) 3.5-44214-1 otic solution; Place 4 drops into the left ear 3 times daily for 10 days Instill into left Ear, Disp-10 mL, R-0Normal  4. Posterior rhinorrhea  -     fluticasone (FLONASE) 50 MCG/ACT nasal spray; 2 sprays by Each Nostril route daily, Disp-16 g, R-0Normal  -     levocetirizine (XYZAL) 5 MG tablet; Take 1 tablet by mouth nightly, Disp-30 tablet, R-2Normal  -     methylPREDNISolone (MEDROL DOSEPACK) 4 MG tablet; Take by mouth., Disp-1 kit, R-0Normal  5. Type 2 diabetes mellitus with hyperglycemia, without long-term current use of insulin ([facility])  -     Hemoglobin A1C; Future  -     CBC Auto Differential; Future  -     Comprehensive Metabolic Panel; Future    Discussed suggestive cause of his hematuria but the need to repeat a urine moving forward to make sure that the blood has cleared from his urine. With resolution of his pain discussed that suggest that the kidney stone has passed but we will continue to monitor and further work-up as course treatments. Discussed physical exam findings including his ear, expected course, and proper use of medication, including OTC medications if prescription is too expensive or insurance does not cover. Stressed the importance of being diligent with his diabetic diet and being compliant with his medications. Discussed signs and symptoms requiring medical attention.   All questions were answered and patient voiced understanding and agreement with plan as discussed. Return if symptoms worsen or fail to improve, for next scheduled follow up with PCP. Subjective       HPI   Last night had some hematuria and pain and believes he had a kidney stone but the pain resolved but he is still having the blood in his urine. He states that he does have a history of kidney stones and states that it feels very similar to kidney stones that he has had in the past.    He does state that he has been having some left ear pain and has been having some congestion and runny nose. He states that he feels better than he did previously but he states is not completely gone away. He does have a history of type 2 diabetes and denies any problems with his medicine or any new symptoms or problems that he contributes to his diabetes at this time. He is attempting to watch his diet. Review of Systems   Constitutional: Negative for activity change, appetite change, fatigue and fever. HENT: Positive for congestion, ear pain, postnasal drip and rhinorrhea. Negative for sore throat. Eyes: Negative for pain and discharge. Respiratory: Negative for cough, shortness of breath and wheezing. Cardiovascular: Negative for chest pain and palpitations. Gastrointestinal: Negative for abdominal pain, constipation, diarrhea, nausea and vomiting. Genitourinary: Positive for flank pain and hematuria. Negative for dysuria. Musculoskeletal: Positive for arthralgias. Negative for back pain, gait problem and neck pain. Skin: Negative for rash and wound. Psychiatric/Behavioral: Negative for dysphoric mood. The patient is not nervous/anxious.          Allergies   Allergen Reactions    Penicillins Hives       Outpatient Medications Prior to Visit   Medication Sig Dispense Refill    TRULICITY 9.19 MC/0.3UF SOPN INJECT 0.75 MG INTO THE SKIN ONCE A WEEK 2 pen 5    metFORMIN (GLUCOPHAGE-XR) 500 MG extended release tablet TAKE 1 TABLET BY MOUTH EVERY DAY WITH BREAKFAST 90 tablet 1    albuterol sulfate  (90 Base) MCG/ACT inhaler INHALE 2 PUFFS INTO THE LUNGS 4 TIMES DAILY AS NEEDED FOR WHEEZING 18 each 1    glucose monitoring kit (FREESTYLE) monitoring kit 1 kit by Does not apply route daily 1 kit 0    blood glucose test strips (ASCENSIA AUTODISC VI;ONE TOUCH ULTRA TEST VI) strip 1 each by In Vitro route daily 100 each 3    Lancets MISC 1 each by Does not apply route daily 100 each 0    diclofenac sodium (VOLTAREN) 1 % GEL Apply 2 g topically 2 times daily 1 Tube 3    Dulaglutide (TRULICITY) 1.5 DZ/9.1RT SOPN Inject 1.5 mg into the skin once a week 4 pen 5    glucose monitoring kit (FREESTYLE) monitoring kit 1 kit by Does not apply route daily DX: Diabetes  Dispense lancet and supplies 1 kit 0    Lancets MISC 1 each by Does not apply route daily Dispense brand per insurance benefits and patient request. 100 each 3    glucose monitoring kit (FREESTYLE) monitoring kit 1 kit by Does not apply route daily as needed DX: Diabetes 1 kit 0    azelastine (ASTELIN) 0.1 % nasal spray 2 sprays by Nasal route 2 times daily Use in each nostril as directed 1 Bottle 0    Multiple Vitamin (MULTIVITAMINS PO) Take 1 tablet by mouth daily.  azithromycin (ZITHROMAX) 250 MG tablet Take 1 tablet by mouth See Admin Instructions for 5 days 500mg on day 1 followed by 250mg on days 2 - 5 (Patient not taking: Reported on 1/11/2022) 6 tablet 0    Lancet Device MISC 1 Device by Does not apply route once for 1 dose 100 Device 0     No facility-administered medications prior to visit.         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 Constitutional:       Appearance: He is well-developed. HENT:      Head: Normocephalic and atraumatic. Right Ear: Hearing, tympanic membrane, ear canal and external ear normal.      Left Ear: Hearing, tympanic membrane and external ear normal.      Ears:      Comments: Left canal erythematous and swollen     Nose: Congestion and rhinorrhea present. Mouth/Throat:      Mouth: Mucous membranes are moist.      Pharynx: No oropharyngeal exudate or posterior oropharyngeal erythema. Comments: Posterior drainage  Eyes:      General: No scleral icterus. Right eye: No discharge. Left eye: No discharge. Conjunctiva/sclera: Conjunctivae normal.      Pupils: Pupils are equal, round, and reactive to light. Neck:      Trachea: No tracheal deviation. Cardiovascular:      Rate and Rhythm: Normal rate and regular rhythm. Heart sounds: Normal heart sounds. No murmur heard. No friction rub. No gallop. Pulmonary:      Effort: Pulmonary effort is normal. No respiratory distress. Breath sounds: Normal breath sounds. No wheezing or rales. Abdominal:      General: Bowel sounds are normal. There is no distension. Palpations: Abdomen is soft. Tenderness: There is no abdominal tenderness. There is no right CVA tenderness or left CVA tenderness. Musculoskeletal:         General: No deformity ( no gross deformities of upper or lower extremities bilaterally). Normal range of motion. Cervical back: Normal range of motion and neck supple. Right lower leg: No edema. Left lower leg: No edema. Lymphadenopathy:      Cervical: No cervical adenopathy.    Skin: General: Skin is warm and dry. Findings: No erythema or rash. Neurological:      Mental Status: He is alert and oriented to person, place, and time. Cranial Nerves: No cranial nerve deficit. Motor: No abnormal muscle tone. Psychiatric:         Behavior: Behavior normal.         Thought Content:  Thought content normal.           Data Reviewed and Summarized       Labs:     Imaging/Testing:        Kelin Huizar MD

## 2022-01-12 LAB
AMORPHOUS: ABNORMAL /HPF
BACTERIA: NEGATIVE /HPF
CRYSTALS, UA: ABNORMAL /HPF
CRYSTALS, UA: ABNORMAL /HPF
EPITHELIAL CELLS, UA: ABNORMAL /HPF
RBC UA: ABNORMAL /HPF (ref 0–2)
WBC UA: ABNORMAL /HPF (ref 0–5)

## 2022-01-14 LAB — URINE CULTURE, ROUTINE: NORMAL

## 2022-01-23 ASSESSMENT — ENCOUNTER SYMPTOMS
SHORTNESS OF BREATH: 0
CONSTIPATION: 0
WHEEZING: 0
ABDOMINAL PAIN: 0
VOMITING: 0
SORE THROAT: 0
COUGH: 0
EYE DISCHARGE: 0
RHINORRHEA: 1
BACK PAIN: 0
EYE PAIN: 0
NAUSEA: 0
DIARRHEA: 0

## 2022-01-24 NOTE — PATIENT INSTRUCTIONS
Patient Education        Learning About Diet for Kidney Stone Prevention  What are kidney stones? Kidney stones are small \"perez\" that form in your kidneys. They're made of salts and minerals in the urine. Stones may not cause a problem as long as they stay in the kidneys. But they can cause sudden, severe pain. Pain is most likely when the stones travel through the ureters (the tubes that carry urine from the kidneys to the bladder). Kidney stones can cause bloody urine. Kidney stones often run in families. You are more likely to get them if you don't drink enough fluids, mainly water. Certain foods and drinks and some dietary supplements may also increase your risk for kidney stones if you consume too much of them. How can you prevent kidney stones with your diet? The following tips may lower your chance of getting kidney stones or from getting them again. · Drink more fluids, especially water, if your doctor says it is okay. This is the most important thing you can do. · Change your diet if you've had a calcium kidney stone. ? Eat less salt and salty foods. One way to do this is to avoid processed foods and limit how often you eat at restaurants. ? Talk to your doctor or dietitian about how much calcium you need every day. Try to get your calcium from food, rather than from supplements. Milk, cheese, and yogurt are all good sources of calcium. · Limit certain foods if you've had an oxalate kidney stone. Your doctor may ask you to limit certain foods that have a lot of oxalate, such as dark green vegetables, nuts, and chocolate. You don't have to give up these foods, just eat or drink less of them. · Change your diet if you've had kidney stones in the past.  ? Eat a balanced diet that is not too high in animal protein. This includes beef, chicken, pork, fish, and eggs. These foods contain a lot of protein, and too much protein may lead to kidney stones. You don't have to give up these foods. Talk to your doctor or dietitian about how much protein you need and the best way to get it. ? Increase how much fiber you eat. Fiber includes oat bran, beans, whole wheat breads, wheat cereals, cabbage, and carrots. ? Avoid grapefruit juice. ? Drink lemonade made from real ilda (not lemon flavoring). It is high in citrate, which may help prevent kidney stones. ? Talk to your doctor if you take vitamins or supplements. Your doctor may want you to limit how much fish liver oil or calcium supplements you take. Also, do not take more than the recommended daily dose of vitamins C and D. Follow-up care is a key part of your treatment and safety. Be sure to make and go to all appointments, and call your doctor if you are having problems. It's also a good idea to know your test results and keep a list of the medicines you take. Where can you learn more? Go to https://Solmentum.Joyus. org and sign in to your [employer] account. Enter C138 in the [employer] box to learn more about \"Learning About Diet for Kidney Stone Prevention. \"     If you do not have an account, please click on the \"Sign Up Now\" link. Current as of: September 8, 2021               Content Version: 13.1  © 2006-2021 Healthwise, Incorporated. Care instructions adapted under license by [facility] [address] If you have questions about a medical condition or this instruction, always ask your healthcare professional. [place] [address] any warranty or liability for your use of this information.

## 2022-02-02 DIAGNOSIS — J34.89 POSTERIOR RHINORRHEA: ICD-10-CM

## 2022-02-02 RX ORDER — FLUTICASONE PROPIONATE 50 MCG
SPRAY, SUSPENSION (ML) NASAL
Qty: 1 EACH | Refills: 2 | Status: SHIPPED | OUTPATIENT
Start: 2022-02-02

## 2022-03-31 DIAGNOSIS — E11.9 TYPE 2 DIABETES MELLITUS WITHOUT COMPLICATION, WITHOUT LONG-TERM CURRENT USE OF INSULIN (HCC): ICD-10-CM

## 2022-03-31 RX ORDER — METFORMIN HYDROCHLORIDE 500 MG/1
TABLET, EXTENDED RELEASE ORAL
Qty: 90 TABLET | Refills: 1 | Status: SHIPPED | OUTPATIENT
Start: 2022-03-31 | End: 2022-07-08 | Stop reason: SDUPTHER

## 2022-04-03 DIAGNOSIS — J34.89 POSTERIOR RHINORRHEA: ICD-10-CM

## 2022-04-04 NOTE — TELEPHONE ENCOUNTER
Last OV 1/11/2022  Next OV Visit date not found      Requested Prescriptions     Pending Prescriptions Disp Refills    levocetirizine (XYZAL) 5 MG tablet [Pharmacy Med Name: LEVOCETIRIZINE 5 MG TABLET] 90 tablet      Sig: TAKE 1 TABLET BY MOUTH NIGHTLY

## 2022-04-07 ENCOUNTER — TELEPHONE (OUTPATIENT)
Dept: FAMILY MEDICINE CLINIC | Age: 52
End: 2022-04-07

## 2022-04-07 DIAGNOSIS — E11.9 TYPE 2 DIABETES MELLITUS WITHOUT COMPLICATION, WITHOUT LONG-TERM CURRENT USE OF INSULIN (HCC): Primary | ICD-10-CM

## 2022-04-07 DIAGNOSIS — E78.2 MIXED HYPERLIPIDEMIA: ICD-10-CM

## 2022-04-07 NOTE — TELEPHONE ENCOUNTER
Called stating his insurance no longer covers Trulicity very well. He is wanting to know if there is an alternative that his insurance might cover better. Please advise.

## 2022-04-11 RX ORDER — LEVOCETIRIZINE DIHYDROCHLORIDE 5 MG/1
5 TABLET, FILM COATED ORAL NIGHTLY
Qty: 90 TABLET | Refills: 3 | Status: SHIPPED | OUTPATIENT
Start: 2022-04-11

## 2022-04-12 NOTE — TELEPHONE ENCOUNTER
Discount card for Trulicity left at .  Lab orders faxed to [facility] per patient request.

## 2022-07-08 ENCOUNTER — OFFICE VISIT (OUTPATIENT)
Dept: FAMILY MEDICINE CLINIC | Age: 52
End: 2022-07-08
Payer: COMMERCIAL

## 2022-07-08 VITALS
HEART RATE: 56 BPM | WEIGHT: 274 LBS | OXYGEN SATURATION: 99 % | HEIGHT: 73 IN | BODY MASS INDEX: 36.31 KG/M2 | SYSTOLIC BLOOD PRESSURE: 172 MMHG | DIASTOLIC BLOOD PRESSURE: 102 MMHG | RESPIRATION RATE: 20 BRPM | TEMPERATURE: 97.5 F

## 2022-07-08 DIAGNOSIS — Z12.5 PROSTATE CANCER SCREENING: ICD-10-CM

## 2022-07-08 DIAGNOSIS — Z56.6 STRESS AT WORK: ICD-10-CM

## 2022-07-08 DIAGNOSIS — E11.9 TYPE 2 DIABETES MELLITUS WITHOUT COMPLICATION, WITHOUT LONG-TERM CURRENT USE OF INSULIN (HCC): Primary | ICD-10-CM

## 2022-07-08 DIAGNOSIS — E11.65 UNCONTROLLED TYPE 2 DIABETES MELLITUS WITH HYPERGLYCEMIA (HCC): Primary | ICD-10-CM

## 2022-07-08 DIAGNOSIS — R10.11 COLICKY RUQ ABDOMINAL PAIN: ICD-10-CM

## 2022-07-08 DIAGNOSIS — R35.0 URINARY FREQUENCY: ICD-10-CM

## 2022-07-08 DIAGNOSIS — E11.9 TYPE 2 DIABETES MELLITUS WITHOUT COMPLICATION, WITHOUT LONG-TERM CURRENT USE OF INSULIN (HCC): ICD-10-CM

## 2022-07-08 DIAGNOSIS — F41.9 ANXIETY: ICD-10-CM

## 2022-07-08 LAB
ALBUMIN SERPL-MCNC: 4.5 G/DL (ref 3.5–5.2)
ALP BLD-CCNC: 99 U/L (ref 40–130)
ALT SERPL-CCNC: 22 U/L (ref 5–41)
ANION GAP SERPL CALCULATED.3IONS-SCNC: 14 MMOL/L (ref 7–19)
AST SERPL-CCNC: 17 U/L (ref 5–40)
BASOPHILS ABSOLUTE: 0.1 K/UL (ref 0–0.2)
BASOPHILS RELATIVE PERCENT: 1.5 % (ref 0–1)
BILIRUB SERPL-MCNC: 0.9 MG/DL (ref 0.2–1.2)
BILIRUBIN URINE: NEGATIVE
BLOOD, URINE: NEGATIVE
BUN BLDV-MCNC: 12 MG/DL (ref 6–20)
CALCIUM SERPL-MCNC: 9.3 MG/DL (ref 8.6–10)
CHLORIDE BLD-SCNC: 101 MMOL/L (ref 98–111)
CHOLESTEROL, TOTAL: 192 MG/DL (ref 160–199)
CLARITY: CLEAR
CO2: 23 MMOL/L (ref 22–29)
COLOR: YELLOW
CREAT SERPL-MCNC: 0.8 MG/DL (ref 0.5–1.2)
CREATININE URINE: 115.7 MG/DL (ref 4.2–622)
EOSINOPHILS ABSOLUTE: 0.2 K/UL (ref 0–0.6)
EOSINOPHILS RELATIVE PERCENT: 3.8 % (ref 0–5)
GFR AFRICAN AMERICAN: >59
GFR NON-AFRICAN AMERICAN: >60
GLUCOSE BLD-MCNC: 235 MG/DL (ref 74–109)
GLUCOSE URINE: =>1000 MG/DL
HBA1C MFR BLD: 9.2 % (ref 4–6)
HCT VFR BLD CALC: 49.5 % (ref 42–52)
HDLC SERPL-MCNC: 43 MG/DL (ref 55–121)
HEMOGLOBIN: 16.4 G/DL (ref 14–18)
IMMATURE GRANULOCYTES #: 0 K/UL
KETONES, URINE: ABNORMAL MG/DL
LDL CHOLESTEROL CALCULATED: 128 MG/DL
LEUKOCYTE ESTERASE, URINE: NEGATIVE
LYMPHOCYTES ABSOLUTE: 2 K/UL (ref 1.1–4.5)
LYMPHOCYTES RELATIVE PERCENT: 33.7 % (ref 20–40)
MCH RBC QN AUTO: 28.9 PG (ref 27–31)
MCHC RBC AUTO-ENTMCNC: 33.1 G/DL (ref 33–37)
MCV RBC AUTO: 87.3 FL (ref 80–94)
MICROALBUMIN UR-MCNC: 2.3 MG/DL (ref 0–19)
MICROALBUMIN/CREAT UR-RTO: 19.9 MG/G
MONOCYTES ABSOLUTE: 0.5 K/UL (ref 0–0.9)
MONOCYTES RELATIVE PERCENT: 8.9 % (ref 0–10)
NEUTROPHILS ABSOLUTE: 3 K/UL (ref 1.5–7.5)
NEUTROPHILS RELATIVE PERCENT: 51.9 % (ref 50–65)
NITRITE, URINE: NEGATIVE
PDW BLD-RTO: 13 % (ref 11.5–14.5)
PH UA: 6 (ref 5–8)
PLATELET # BLD: 194 K/UL (ref 130–400)
PMV BLD AUTO: 10.8 FL (ref 9.4–12.4)
POTASSIUM REFLEX MAGNESIUM: 4 MMOL/L (ref 3.5–5)
PROSTATE SPECIFIC ANTIGEN: 1.04 NG/ML (ref 0–4)
PROTEIN UA: NEGATIVE MG/DL
RBC # BLD: 5.67 M/UL (ref 4.7–6.1)
SODIUM BLD-SCNC: 138 MMOL/L (ref 136–145)
SPECIFIC GRAVITY UA: 1.03 (ref 1–1.03)
TOTAL PROTEIN: 7 G/DL (ref 6.6–8.7)
TRIGL SERPL-MCNC: 105 MG/DL (ref 0–149)
TSH REFLEX FT4: 2 UIU/ML (ref 0.35–5.5)
UROBILINOGEN, URINE: 0.2 E.U./DL
WBC # BLD: 5.9 K/UL (ref 4.8–10.8)

## 2022-07-08 PROCEDURE — 3046F HEMOGLOBIN A1C LEVEL >9.0%: CPT | Performed by: NURSE PRACTITIONER

## 2022-07-08 PROCEDURE — 99215 OFFICE O/P EST HI 40 MIN: CPT | Performed by: NURSE PRACTITIONER

## 2022-07-08 RX ORDER — SILDENAFIL CITRATE 20 MG/1
TABLET ORAL
Qty: 60 TABLET | Refills: 2 | Status: SHIPPED | OUTPATIENT
Start: 2022-07-08 | End: 2022-08-31 | Stop reason: SDUPTHER

## 2022-07-08 RX ORDER — METFORMIN HYDROCHLORIDE 500 MG/1
TABLET, EXTENDED RELEASE ORAL
Qty: 90 TABLET | Refills: 1 | Status: SHIPPED | OUTPATIENT
Start: 2022-07-08 | End: 2022-07-09 | Stop reason: SDUPTHER

## 2022-07-08 SDOH — HEALTH STABILITY - MENTAL HEALTH: OTHER PHYSICAL AND MENTAL STRAIN RELATED TO WORK: Z56.6

## 2022-07-08 ASSESSMENT — PATIENT HEALTH QUESTIONNAIRE - PHQ9
SUM OF ALL RESPONSES TO PHQ QUESTIONS 1-9: 0
1. LITTLE INTEREST OR PLEASURE IN DOING THINGS: 0
2. FEELING DOWN, DEPRESSED OR HOPELESS: 0
SUM OF ALL RESPONSES TO PHQ9 QUESTIONS 1 & 2: 0
SUM OF ALL RESPONSES TO PHQ QUESTIONS 1-9: 0

## 2022-07-08 ASSESSMENT — ENCOUNTER SYMPTOMS: RESPIRATORY NEGATIVE: 1

## 2022-07-08 NOTE — LETTER
[facility]  [address] [address]  Phone: [phone]  Fax: [phone]    DEVIN Platt        July 8, 2022     Patient: Dorothy Williamson   YOB: 1970   Date of Visit: 7/8/2022       To Whom it May Concern:    Dorothy Williamson was seen in my clinic on 7/8/2022. He may return to work on 7/11/2023. If you have any questions or concerns, please don't hesitate to call.     Sincerely,         DEVIN Platt

## 2022-07-08 NOTE — PROGRESS NOTES
SUBJECTIVE:    Patient ID: Hank Laird is a54 y.o. male. Hank Laird is here today for Diabetes (Patient presents for diabetes.) and Stress  . HPI:   HPI         Pt is here to f/u on diabetes and concern of other health needs. Patient does not have glucose log for review today. Patient's last lab is stated to have been done at [facility] when it was ordered by patient's previous primary care providerIn April. Those results are not available for review here today. We have called [facility] and the results have still not arrived at the close of this visit. Patient would be due for lab anyhow and he is fasting today. Took Trulicity without difficulty. However, states that the last time he went for medication refill the cost was too excessive. Takes metformin XR most days. Patient does not adhere to diabetic diet. Patient also works a more sedentary job. Pt is having increased stress with work uncertainty. Wife has requested patient discussed this with me today. He has taken sertraline for short time in the past and did not have any negative effects. He does agree that he does feel more short fused. We will work to restart sertraline at this time at a low dose and he is aware that titration may be necessary. Is interested in wt loss aid. He is interested in phentermine but bp is too high at first check. 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 However, patient does seem to think that his stress level may be contributing to his blood pressure elevation and we will try to be conservative with this as we do start sertraline and then patient will be following up in approximately a 2-week timeframe to see if blood pressure is improving. Urgency in urination at bedtime. Reports that he has been evaluated by urology in the past and does wish to be reevaluated and further see urology. He also states that he has been given sildenafil related to ED and does request refill. It was a successful treatment. Had GB surgery years ago. Feels he woke up in surgery and thought surgeon was screaming and arguing with staff in [address] [place] [address]. Dr. Pickens was a surgeon at that time. Since then he reports having ongoing issues that he feels are related to situs inversus. Does report having some tenderness to this right upper area. I do feel that we should move forward with an ultrasound to verify that everything looks healthy. He and I have spoken about potential of scar tissue that is causing some of the pulling sensation that he feels from time to time. Patient has been made aware in the past that cholesterol treatment is ideal with his history of diabetes. He is well aware of this. However, he does state in the past he thinks that was too expensive on his insurance plan. Past Medical History:   Diagnosis Date    Diabetes mellitus ([facility] [address]     Kidney stones     Situs inversus 04/10/2016    Liver & Spleen - Was told in ER     Prior to Visit Medications    Medication Sig Taking?  Authorizing Provider   sertraline (ZOLOFT) 50 MG tablet 1/2 po daily x 1wk then increase to 1 po daily TAKE WITH FOOD Yes DEVIN Noble   metFORMIN (GLUCOPHAGE-XR) 500 MG extended release tablet TAKE 1 TABLET BY MOUTH EVERY DAY WITH BREAKFAST Yes DEVIN Noble   sildenafil (REVATIO) 20 MG tablet 1-5 po 30-60mins before sex Yes DEVIN Noble   levocetirizine (XYZAL) 5 MG tablet TAKE 1 TABLET BY MOUTH NIGHTLY Yes DEVIN Noble   fluticasone (FLONASE) 50 MCG/ACT nasal spray SPRAY 2 SPRAYS INTO EACH NOSTRIL EVERY DAY Yes DEVIN Noble   albuterol sulfate  (90 Base) MCG/ACT inhaler INHALE 2 PUFFS INTO THE LUNGS 4 TIMES DAILY AS NEEDED FOR WHEEZING Yes DEVIN Noble   glucose monitoring kit (FREESTYLE) monitoring kit 1 kit by Does not apply route daily Yes DEVIN Noble   blood glucose test strips (ASCENSIA AUTODISC VI;ONE TOUCH ULTRA TEST VI) strip 1 each by In Vitro route daily Yes DEVIN Noble   Lancets MISC 1 each by Does not apply route daily Yes DEVIN Noble   diclofenac sodium (VOLTAREN) 1 % GEL Apply 2 g topically 2 times daily Yes Bakari Han MD   glucose monitoring kit (FREESTYLE) monitoring kit 1 kit by Does not apply route daily DX: Diabetes  Dispense lancet and supplies Yes Bakari Han MD   Lancets MISC 1 each by Does not apply route daily Dispense brand per insurance benefits and patient request. Yes DEVIN Noble   glucose monitoring kit (FREESTYLE) monitoring kit 1 kit by Does not apply route daily as needed DX: Diabetes Yes DEVIN Noble   azelastine (ASTELIN) 0.1 % nasal spray 2 sprays by Nasal route 2 times daily Use in each nostril as directed Yes DEVIN Noble   Multiple Vitamin (MULTIVITAMINS PO) Take 1 tablet by mouth daily.  Yes Historical Provider, MD   ketorolac (TORADOL) 10 MG tablet Take 1 tablet by mouth every 6 hours as needed for Pain  Sanchez Velazquez MD   Lancet Device MISC 1 Device by Does not apply route once for 1 dose  Bakari Han MD     Allergies   Allergen Reactions    Penicillins Hives     Past Surgical History:   Procedure Laterality Date    CHOLECYSTECTOMY       Family History   Problem Relation Age of Onset    Cancer Mother         colon     Other Father         kidney stone      Social History     Socioeconomic History    Marital status:      Spouse name: Not on file    Number of children: Not on file    Years of education: Not on file    Highest education level: Not on file   Occupational History    Not on file   Tobacco Use    Smoking status: Never Smoker    Smokeless tobacco: Never Used   Substance and Sexual Activity    Alcohol use: No    Drug use: No    Sexual activity: Yes     Partners: Female   Other Topics Concern    Not on file   Social History Narrative    Not on file     Social Determinants of Health     Financial Resource Strain: Low Risk     Difficulty of Paying Living Expenses: Not hard at all   Food Insecurity: No Food Insecurity    Worried About Running Out of Food in the Last Year: Never true    Amara of Food in the Last Year: Never true   Transportation Needs: No Transportation Needs    Lack of Transportation (Medical): No    Lack of Transportation (Non-Medical): No   Physical Activity:     Days of Exercise per Week: Not on file    Minutes of Exercise per Session: Not on file   Stress:     Feeling of Stress : Not on file   Social Connections:     Frequency of Communication with Friends and Family: Not on file    Frequency of Social Gatherings with Friends and Family: Not on file    Attends Sabianist Services: Not on file    Active Member of [employer] or Organizations: Not on file    Attends Club or Organization Meetings: Not on file    Marital Status: Not on file   Intimate Partner Violence:     Fear of Current or Ex-Partner: Not on file    Emotionally Abused: Not on file    Physically Abused: Not on file    Sexually Abused: Not on file   Housing Stability:     Unable to Pay for Housing in the Last Year: Not on file    Number of [place] in the Last Year: Not on file    Unstable Housing in the Last Year: Not on file       Review of Systems   Constitutional: Negative for unexpected weight change. Respiratory: Negative. Cardiovascular: Negative. Genitourinary: Positive for frequency. Neurological: Negative. Psychiatric/Behavioral: The patient is nervous/anxious. OBJECTIVE:    Physical Exam  Vitals and nursing note reviewed. Constitutional:       General: He is not in acute distress. Appearance: Normal appearance. He is well-developed. He is not ill-appearing or toxic-appearing. HENT:      Head: Normocephalic and atraumatic. Right Ear: External ear normal.      Left Ear: External ear normal.   Eyes:      Extraocular Movements: Extraocular movements intact. Conjunctiva/sclera: Conjunctivae normal.      Pupils: Pupils are equal, round, and reactive to light. Neck:      Vascular: No carotid bruit. Trachea: No tracheal deviation. Cardiovascular:      Rate and Rhythm: Normal rate and regular rhythm. Heart sounds: Normal heart sounds. No murmur heard. Pulmonary:      Effort: Pulmonary effort is normal.      Breath sounds: Normal breath sounds. Abdominal:      General: Bowel sounds are normal. There is no distension. Palpations: Abdomen is soft. Tenderness: There is abdominal tenderness (ruq). Musculoskeletal:      Cervical back: Neck supple. No rigidity or tenderness. Right lower leg: No edema. Left lower leg: No edema. Lymphadenopathy:      Cervical: No cervical adenopathy. Skin:     General: Skin is warm and dry. Capillary Refill: Capillary refill takes less than 2 seconds. Neurological:      General: No focal deficit present. Mental Status: He is alert and oriented to person, place, and time. Mental status is at baseline. Psychiatric:         Mood and Affect: Mood normal. Mood is not anxious or depressed. Affect is not angry. Speech: Speech normal.         Behavior: Behavior normal.         Thought Content: Thought content normal.         Judgment: Judgment normal.         BP (!) 172/102 (Site: Left Upper Arm, Position: Sitting, Cuff Size: Large Adult)   Pulse 56   Temp 97.5 °F (36.4 °C) (Temporal)   Resp 20   Ht 6' 1\" (1.854 m)   Wt 274 lb (124.3 kg)   SpO2 99%   BMI 36.15 kg/m²      ASSESSMENT:      ICD-10-CM    1. Type 2 diabetes mellitus without complication, without long-term current use of insulin ([facility])  E11.9 Urinalysis with Reflex to Culture     CBC with Auto Differential     Comprehensive Metabolic Panel w/ Reflex to MG     Hemoglobin A1C     Lipid Panel     TSH with Reflex to FT4     Microalbumin / Creatinine Urine Ratio     metFORMIN (GLUCOPHAGE-XR) 500 MG extended release tablet   2. Anxiety  F41.9 sertraline (ZOLOFT) 50 MG tablet   3. Prostate cancer screening  Z12.5 PSA Screening   4. Colicky RUQ abdominal pain  R10.11 US GALLBLADDER RUQ     CANCELED: US GALLBLADDER RUQ   5. Urinary frequency  R35.0 sildenafil (REVATIO) 20 MG tablet     External Referral To Urology   6. Stress at work  Z56.6 sertraline (ZOLOFT) 50 MG tablet       PLAN:    Maria Esther Marie: Diabetes (Patient presents for diabetes.) and Stress  unable to start phentermine r/t bp. Start zoloft and f/u in 2wks for bp check in event bp is elevated related to stress response. Last lab needed for review at this visit ---it was done at [facility]---still not here despite call for it  Lab today  Referral  U/s ruq needed  Check glucose at least twice a week and if feeling unusual in any way  Total time -43mins    Diagnosis and orders for this visit are above. Please note that this chart was generated using dragon dictationsoftware. Although every effort was made to ensure the accuracy of this automated transcription, some errors in transcription may have occurred.

## 2022-07-09 DIAGNOSIS — E11.9 TYPE 2 DIABETES MELLITUS WITHOUT COMPLICATION, WITHOUT LONG-TERM CURRENT USE OF INSULIN (HCC): ICD-10-CM

## 2022-07-09 RX ORDER — SEMAGLUTIDE 1.34 MG/ML
INJECTION, SOLUTION SUBCUTANEOUS
Qty: 1.5 ML | Refills: 2 | Status: SHIPPED | OUTPATIENT
Start: 2022-07-09 | End: 2022-10-11

## 2022-07-09 RX ORDER — METFORMIN HYDROCHLORIDE 500 MG/1
TABLET, EXTENDED RELEASE ORAL
Qty: 180 TABLET | Refills: 1 | Status: SHIPPED | OUTPATIENT
Start: 2022-07-09

## 2022-07-14 ENCOUNTER — TELEPHONE (OUTPATIENT)
Dept: FAMILY MEDICINE CLINIC | Age: 52
End: 2022-07-14

## 2022-07-14 DIAGNOSIS — E11.9 TYPE 2 DIABETES MELLITUS WITHOUT COMPLICATION, WITHOUT LONG-TERM CURRENT USE OF INSULIN (HCC): ICD-10-CM

## 2022-07-14 NOTE — TELEPHONE ENCOUNTER
----- Message from DEVIN Styles sent at 7/9/2022 11:46 AM CDT -----  No microscopic protein found in urine however urine sample does have a trace of ketones. There is no further blood or protein found in the urine; this was found on the last urine. Cholesterol level is stable for patient but is not controlled for a diabetic patient. LDL cholesterol should be 70s or below for diabetic --statin treatment such as Crestor, Lipitor, simvastatin should be considered. Patient has been made aware of this in the past.  If he is willing to start at this time, please let me know. Electrolyte panel, kidney function, liver functions are all healthy, but blood sugar is significantly increasing. 3-month blood sugar is showing loss of control of diabetes and now at 9.2%. The risk of kidney issues, vision problems, heart attack, stroke significantly increases as we lose sight of control. General blood counts are healthy. Prostate blood testing and thyroid blood testing are also healthy. BP was elevated at last visit. I suggest a follow up visit in 2wks of starting the zoloft to see if BP is going to improve related to Armida Loveless feeling stress was playing a role. At that visit, we will address any questions he has regarding diabetes. . In the meantime, we cannot wait. This level a1c is enough to consider insulin but with him driving, I know that isn't what he wants. Plan to adhere:  -check glucose every single morning fasting and record it on notepad  -on occasion, like twice a week, check glucose 2hrs after largest meal instead of fasting.  -dietician harinder is a must and adherence to plan is even more important  -weight loss goal of 20 pounds in the next 3months. This is doable once you meet with dietician.   -increase from 500mg metformin XR to TWO daily---can be taken together or 1 twice daily but needs to be done; notify me if any GI issues occur  -restart injectable DM med as you were once doing Trulicity.   [place],

## 2022-07-15 RX ORDER — SEMAGLUTIDE 1.34 MG/ML
INJECTION, SOLUTION SUBCUTANEOUS
Qty: 1.5 ML | Refills: 1 | Status: SHIPPED | OUTPATIENT
Start: 2022-07-15 | End: 2022-07-15 | Stop reason: SDUPTHER

## 2022-08-29 DIAGNOSIS — E11.65 UNCONTROLLED TYPE 2 DIABETES MELLITUS WITH HYPERGLYCEMIA (HCC): ICD-10-CM

## 2022-08-29 LAB
ALBUMIN SERPL-MCNC: 4.5 G/DL (ref 3.5–5.2)
ALP BLD-CCNC: 78 U/L (ref 40–130)
ALT SERPL-CCNC: 14 U/L (ref 5–41)
ANION GAP SERPL CALCULATED.3IONS-SCNC: 13 MMOL/L (ref 7–19)
AST SERPL-CCNC: 13 U/L (ref 5–40)
BILIRUB SERPL-MCNC: 1.1 MG/DL (ref 0.2–1.2)
BILIRUBIN URINE: NEGATIVE
BLOOD, URINE: NEGATIVE
BUN BLDV-MCNC: 12 MG/DL (ref 6–20)
CALCIUM SERPL-MCNC: 9.3 MG/DL (ref 8.6–10)
CHLORIDE BLD-SCNC: 102 MMOL/L (ref 98–111)
CHOLESTEROL, TOTAL: 159 MG/DL (ref 160–199)
CLARITY: ABNORMAL
CO2: 24 MMOL/L (ref 22–29)
COLOR: YELLOW
CREAT SERPL-MCNC: 0.9 MG/DL (ref 0.5–1.2)
GFR AFRICAN AMERICAN: >59
GFR NON-AFRICAN AMERICAN: >60
GLUCOSE BLD-MCNC: 107 MG/DL (ref 74–109)
GLUCOSE URINE: NEGATIVE MG/DL
HBA1C MFR BLD: 7 % (ref 4–6)
HDLC SERPL-MCNC: 41 MG/DL (ref 55–121)
KETONES, URINE: NEGATIVE MG/DL
LDL CHOLESTEROL CALCULATED: 96 MG/DL
LEUKOCYTE ESTERASE, URINE: NEGATIVE
NITRITE, URINE: NEGATIVE
PH UA: 6 (ref 5–8)
POTASSIUM SERPL-SCNC: 4.1 MMOL/L (ref 3.5–5)
PROTEIN UA: NEGATIVE MG/DL
SODIUM BLD-SCNC: 139 MMOL/L (ref 136–145)
SPECIFIC GRAVITY UA: 1.02 (ref 1–1.03)
TOTAL PROTEIN: 7.1 G/DL (ref 6.6–8.7)
TRIGL SERPL-MCNC: 109 MG/DL (ref 0–149)
UROBILINOGEN, URINE: 0.2 E.U./DL

## 2022-08-31 ENCOUNTER — OFFICE VISIT (OUTPATIENT)
Dept: FAMILY MEDICINE CLINIC | Age: 52
End: 2022-08-31
Payer: COMMERCIAL

## 2022-08-31 VITALS
HEIGHT: 73 IN | TEMPERATURE: 97.3 F | RESPIRATION RATE: 20 BRPM | WEIGHT: 254 LBS | SYSTOLIC BLOOD PRESSURE: 128 MMHG | DIASTOLIC BLOOD PRESSURE: 72 MMHG | OXYGEN SATURATION: 99 % | BODY MASS INDEX: 33.66 KG/M2 | HEART RATE: 64 BPM

## 2022-08-31 DIAGNOSIS — N52.9 ERECTILE DYSFUNCTION, UNSPECIFIED ERECTILE DYSFUNCTION TYPE: ICD-10-CM

## 2022-08-31 DIAGNOSIS — E11.65 TYPE 2 DIABETES MELLITUS WITH HYPERGLYCEMIA, WITHOUT LONG-TERM CURRENT USE OF INSULIN (HCC): Primary | ICD-10-CM

## 2022-08-31 DIAGNOSIS — R35.0 URINARY FREQUENCY: ICD-10-CM

## 2022-08-31 PROCEDURE — 3051F HG A1C>EQUAL 7.0%<8.0%: CPT | Performed by: NURSE PRACTITIONER

## 2022-08-31 PROCEDURE — 99214 OFFICE O/P EST MOD 30 MIN: CPT | Performed by: NURSE PRACTITIONER

## 2022-08-31 RX ORDER — SILDENAFIL CITRATE 20 MG/1
TABLET ORAL
Qty: 60 TABLET | Refills: 5 | Status: SHIPPED | OUTPATIENT
Start: 2022-08-31

## 2022-08-31 SDOH — ECONOMIC STABILITY: FOOD INSECURITY: WITHIN THE PAST 12 MONTHS, YOU WORRIED THAT YOUR FOOD WOULD RUN OUT BEFORE YOU GOT MONEY TO BUY MORE.: NEVER TRUE

## 2022-08-31 SDOH — ECONOMIC STABILITY: FOOD INSECURITY: WITHIN THE PAST 12 MONTHS, THE FOOD YOU BOUGHT JUST DIDN'T LAST AND YOU DIDN'T HAVE MONEY TO GET MORE.: NEVER TRUE

## 2022-08-31 ASSESSMENT — SOCIAL DETERMINANTS OF HEALTH (SDOH): HOW HARD IS IT FOR YOU TO PAY FOR THE VERY BASICS LIKE FOOD, HOUSING, MEDICAL CARE, AND HEATING?: NOT HARD AT ALL

## 2022-08-31 ASSESSMENT — ENCOUNTER SYMPTOMS: RESPIRATORY NEGATIVE: 1

## 2022-08-31 NOTE — PROGRESS NOTES
SUBJECTIVE:    Patient ID: Harriet Alonso is a54 y.o. male. Harriet Alonso is here today for Follow-up (Patient presents for follow up on labs. I reviewed labs with him and printed him a copy. Patient also is following up on medication and blood pressure.)  . HPI:   HPI       F/u on diabetes and recent elevated blood pressure. Has changed eating habits completely. Zero sugar drinks. Has been checking glucose at times but not daily. Bp last visit was quite elevated and today it is normotensive. Patient has had significant weight gain nearly 30 pounds at this time. He has been working very hard to take his medications regularly. He continues to stay active playing [employer]. 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  With patient having undergone multiple changes in the last few months I am going to hold off till next lab in November before adding statin treatment. No visits with results within 1 Day(s) from this visit.    Latest known visit with results is:   Orders Only on 08/29/2022   Component Date Value Ref Range Status    Color, UA 08/29/2022 YELLOW  Straw/Yellow Final    Clarity, UA 08/29/2022 TURBID (A) Clear Final    Glucose, Ur 08/29/2022 NEGATIVE  Negative mg/dL Final    Bilirubin Urine 08/29/2022 Negative  Negative Final    Ketones, Urine 08/29/2022 Negative  Negative mg/dL Final    Specific Gravity, UA 08/29/2022 1.025  1.005 - 1.030 Final    Blood, Urine 08/29/2022 Negative  Negative Final    pH, UA 08/29/2022 6.0  5.0 - 8.0 Final    Protein, UA 08/29/2022 Negative  Negative mg/dL Final    Urobilinogen, Urine 08/29/2022 0.2  <2.0 E.U./dL Final    Nitrite, Urine 08/29/2022 Negative  Negative Final    Leukocyte Esterase, Urine 08/29/2022 Negative  Negative Final    Comment: Culture Urine under CMS guidelines and criteria will auto reflex on a sole  criteria that is based on manual microscopic count of more than 10/hpf for  WBC. If Culture Urine is warranted aside from this criteria, place a  requisition or order within 24 hours of collection. Hemoglobin A1C 08/29/2022 7.0 (A) 4.0 - 6.0 % Final    Comment: HbA1c levels >6% are an indication of hyperglycemia during the preceding 2  to 3 months or longer. HbA1c levels may reach 20% or higher in poorly controlled diabetes. Therapeutic action is suggested at levels above 8%. Diabetes patients with HbA1c levels below 7% meet the goal of the American  Diabetes Association. HbA1c levels below the established reference range may indicate recent  episodes of hypoglycemia, the presence of Hb variants, or shortened lifetime  of erythrocytes.        Cholesterol, Total 08/29/2022 159 (A) 160 - 199 mg/dL Final    Comment: <160 MG/DL=OPTIMAL    160-199 MG/DL= DESIRABLE    200-239 MG/DL=BORDERLINE-INCREASED RISK OF ATHEROSCLEROTIC  CARDIOVASCULAR DISEASE    > OR = 240 MG/DL-ASSOCIATED WITH AN INCREASED RISK OF  ATHROSCLEROTIC CARDIOVASCULAR DISEASE      Triglycerides 08/29/2022 109  0 - 149 mg/dL Final    HDL 08/29/2022 41 (A) 55 - 121 mg/dL Final    Comment: VALUES>60 MG/DL ARE ASSOCIATED WITH A DECREASED RISK OF  ATHEROSCLEROTIC CARDIOVASCULAR DISEASE      LDL Calculated 08/29/2022 96  <100 mg/dL Final    Comment: <100 MG/DL=OPITIMAL    100-129 MG/DL=DESIRABLE    130-159 MG/DL BORDERLINE=INCREASED RISK OF ATHEROSCLEROTIC  CARDIOVASCULAR DISEASE    > OR = 160 MG/DL=ASSOCIATED WITH AN INCREASE RISK OF  ATHEROSCLEROTIC CARDIOVASCULAR DISEASE      Sodium 08/29/2022 139  136 - 145 mmol/L Final    Potassium 08/29/2022 4.1  3.5 - 5.0 mmol/L Final    Chloride 08/29/2022 102  98 - 111 mmol/L Final    CO2 08/29/2022 24  22 - 29 mmol/L Final    Anion Gap 08/29/2022 13  7 - 19 mmol/L Final    Glucose 08/29/2022 107  74 - 109 mg/dL Final    BUN 08/29/2022 12  6 - 20 mg/dL Final    Creatinine 08/29/2022 0.9  0.5 - 1.2 mg/dL Final    GFR Non- 08/29/2022 >60  >60 Final    Comment: This calculation may be inaccurate for patients under the age of 25 years. For ages 25 and older, a GFR >60 mL/min/1.73m2 (not corrected for weight) is  valid for stable renal function. GFR  08/29/2022 >59  >59 Final    Comment: Chronic Kidney Disease: less than 60 ml/min/1.73 sq.m. Kidney Failure: less than 15 ml/min/1.73 sq.m. Results valid for patients 18 years and older. 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  Authorizing Provider   blood glucose test strips (ASCENSIA AUTODISC VI;ONE TOUCH ULTRA TEST VI) strip 1 each by In Vitro route daily Yes DEVIN Noble   sildenafil (REVATIO) 20 MG tablet 1-5 po 30-60mins before sex Yes DEVIN Noble   metFORMIN (GLUCOPHAGE-XR) 500 MG extended release tablet TAKE 2 TABLETs BY MOUTH EVERY DAY WITH BREAKFAST Yes DEVIN Noble   Semaglutide,0.25 or 0.5MG/DOS, (OZEMPIC, 0.25 OR 0.5 MG/DOSE,) 2 MG/1.5ML SOPN 0.25mg subq weekly x 2 wks then increase to 0.5mg subq weekly Yes DEVIN Noble   sertraline (ZOLOFT) 50 MG tablet 1/2 po daily x 1wk then increase to 1 po daily TAKE WITH FOOD Yes DEVIN Noble   levocetirizine (XYZAL) 5 MG tablet TAKE 1 TABLET BY MOUTH NIGHTLY Yes DEVIN Noble   fluticasone (FLONASE) 50 MCG/ACT nasal spray SPRAY 2 SPRAYS INTO EACH NOSTRIL EVERY DAY Yes DEVIN Howe albuterol sulfate  (90 Base) MCG/ACT inhaler INHALE 2 PUFFS INTO THE LUNGS 4 TIMES DAILY AS NEEDED FOR WHEEZING Yes DEVIN Noble   glucose monitoring kit (FREESTYLE) monitoring kit 1 kit by Does not apply route daily Yes DEVIN Noble   Lancets MISC 1 each by Does not apply route daily Yes DEVIN Noble   diclofenac sodium (VOLTAREN) 1 % GEL Apply 2 g topically 2 times daily Yes Kirk Lott MD   glucose monitoring kit (FREESTYLE) monitoring kit 1 kit by Does not apply route daily DX: Diabetes  Dispense lancet and supplies Yes Kirk Lott MD   Lancets MISC 1 each by Does not apply route daily Dispense brand per insurance benefits and patient request. Yes DEVIN Noble   glucose monitoring kit (FREESTYLE) monitoring kit 1 kit by Does not apply route daily as needed DX: Diabetes Yes DEVIN Noble   azelastine (ASTELIN) 0.1 % nasal spray 2 sprays by Nasal route 2 times daily Use in each nostril as directed Yes DEVIN Noble   Multiple Vitamin (MULTIVITAMINS PO) Take 1 tablet by mouth daily.  Yes Historical Provider, MD   ketorolac (TORADOL) 10 MG tablet Take 1 tablet by mouth every 6 hours as needed for Pain  Case Geller MD   Lancet Device MISC 1 Device by Does not apply route once for 1 dose  Kirk Lott MD     Allergies   Allergen Reactions    Penicillins Hives     Past Surgical History:   Procedure Laterality Date    CHOLECYSTECTOMY       Family History   Problem Relation Age of Onset    Cancer Mother         colon     Other Father         kidney stone      Social History     Socioeconomic History    Marital status:      Spouse name: Not on file    Number of children: Not on file    Years of education: Not on file    Highest education level: Not on file   Occupational History    Not on file   Tobacco Use    Smoking status: Never    Smokeless tobacco: Never   Substance and Sexual Activity    Alcohol use: No    Drug use: No    Sexual activity: Yes     Partners: Female   Other Topics Concern    Not on file   Social History Narrative    Not on file     Social Determinants of Health     Financial Resource Strain: Low Risk     Difficulty of Paying Living Expenses: Not hard at all   Food Insecurity: No Food Insecurity    Worried About Running Out of Food in the Last Year: Never true    Ran Out of Food in the Last Year: Never true   Transportation Needs: Not on file   Physical Activity: Not on file   Stress: Not on file   Social Connections: Not on file   Intimate Partner Violence: Not on file   Housing Stability: Not on file       Review of Systems   Constitutional:  Negative for unexpected weight change. Respiratory: Negative. Cardiovascular: Negative. Neurological: Negative. Psychiatric/Behavioral: Negative. OBJECTIVE:    Physical Exam  Vitals and nursing note reviewed. Constitutional:       General: He is not in acute distress. Appearance: Normal appearance. He is well-developed. He is not ill-appearing, toxic-appearing or diaphoretic. HENT:      Head: Normocephalic and atraumatic. Eyes:      Extraocular Movements: Extraocular movements intact. Conjunctiva/sclera: Conjunctivae normal.      Pupils: Pupils are equal, round, and reactive to light. Neck:      Trachea: No tracheal deviation. Cardiovascular:      Rate and Rhythm: Normal rate and regular rhythm. Heart sounds: Normal heart sounds. No murmur heard. Pulmonary:      Effort: Pulmonary effort is normal.      Breath sounds: Normal breath sounds. Musculoskeletal:      Cervical back: Neck supple. No rigidity. Right lower leg: No edema. Left lower leg: No edema. Lymphadenopathy:      Cervical: No cervical adenopathy. Skin:     General: Skin is warm and dry. Capillary Refill: Capillary refill takes less than 2 seconds. Neurological:      General: No focal deficit present. Mental Status: He is alert and oriented to person, place, and time. Mental status is at baseline.

## 2022-10-04 NOTE — TELEPHONE ENCOUNTER
Aletha Mimi called to request a refill on his medication.       Last office visit : 8/31/2022   Next office visit : Visit date not found     Requested Prescriptions     Pending Prescriptions Disp Refills    Semaglutide,0.25 or 0.5MG/DOS, (OZEMPIC, 0.25 OR 0.5 MG/DOSE,) 2 MG/1.5ML SOPN [Pharmacy Med Name: OZEMPIC 0.25-0.5 MG/DOSE PEN]       Sig: INJECT 0.25MG SUBQ WEEKLY X 2 WEEKS THEN INCREASE TO 0.5MG SUBQ WEEKLY            Shay Velasquez, JOSE

## 2022-10-11 RX ORDER — SEMAGLUTIDE 1.34 MG/ML
INJECTION, SOLUTION SUBCUTANEOUS
Qty: 1.5 ML | Refills: 5 | Status: SHIPPED | OUTPATIENT
Start: 2022-10-11

## 2023-01-02 DIAGNOSIS — Z56.6 STRESS AT WORK: ICD-10-CM

## 2023-01-02 DIAGNOSIS — F41.9 ANXIETY: ICD-10-CM

## 2023-01-02 SDOH — HEALTH STABILITY - MENTAL HEALTH: OTHER PHYSICAL AND MENTAL STRAIN RELATED TO WORK: Z56.6

## 2023-01-19 ENCOUNTER — TELEPHONE (OUTPATIENT)
Dept: FAMILY MEDICINE CLINIC | Age: 53
End: 2023-01-19

## 2023-01-19 NOTE — TELEPHONE ENCOUNTER
Rosalind Justine called to request a refill on his medication.       Last office visit : 8/31/2022   Next office visit : Visit date not found     Requested Prescriptions     Pending Prescriptions Disp Refills    Semaglutide,0.25 or 0.5MG/DOS, (OZEMPIC, 0.25 OR 0.5 MG/DOSE,) 2 MG/1.5ML SOPN 1.5 mL 5     Sig: INJECT 0.25MG SUBQ WEEKLY X 2 WEEKS THEN INCREASE TO 0.5MG SUBQ WEEKLY            Denny Huffman, CMA

## 2023-01-20 RX ORDER — SEMAGLUTIDE 1.34 MG/ML
INJECTION, SOLUTION SUBCUTANEOUS
Qty: 1.5 ML | Refills: 5 | Status: SHIPPED | OUTPATIENT
Start: 2023-01-20

## 2023-01-30 RX ORDER — ORAL SEMAGLUTIDE 7 MG/1
TABLET ORAL
Qty: 30 TABLET | Refills: 0 | Status: SHIPPED | OUTPATIENT
Start: 2023-01-30

## 2023-01-30 RX ORDER — ORAL SEMAGLUTIDE 3 MG/1
TABLET ORAL
Qty: 15 TABLET | Refills: 0 | Status: SHIPPED | OUTPATIENT
Start: 2023-01-30

## 2023-02-08 ENCOUNTER — TELEPHONE (OUTPATIENT)
Dept: FAMILY MEDICINE CLINIC | Age: 53
End: 2023-02-08

## 2023-02-08 RX ORDER — ORAL SEMAGLUTIDE 3 MG/1
TABLET ORAL
Qty: 30 TABLET | Refills: 0 | Status: CANCELLED | OUTPATIENT
Start: 2023-02-08

## 2023-03-08 ENCOUNTER — TELEPHONE (OUTPATIENT)
Dept: FAMILY MEDICINE CLINIC | Age: 53
End: 2023-03-08

## 2023-03-08 RX ORDER — SEMAGLUTIDE 1.34 MG/ML
INJECTION, SOLUTION SUBCUTANEOUS
Qty: 4.5 ML | Refills: 1 | Status: SHIPPED | OUTPATIENT
Start: 2023-03-08

## 2023-03-08 NOTE — TELEPHONE ENCOUNTER
Wife called and states that the #30 day supply of Ozempic is too expensive and its more affordable for 90 day supply and she would like the Rx sent to [facility] in [place]. I thought the patient was switched to Rybelsus because of availability of this medication at the pharmacy.

## 2023-03-09 NOTE — TELEPHONE ENCOUNTER
I have sent the 0.5mg ozempic for 3mo to the requested location.  He will need to stop Rybelsus when restarting this.

## 2023-03-15 DIAGNOSIS — E11.9 TYPE 2 DIABETES MELLITUS WITHOUT COMPLICATION, WITHOUT LONG-TERM CURRENT USE OF INSULIN (HCC): ICD-10-CM

## 2023-03-15 RX ORDER — METFORMIN HYDROCHLORIDE 500 MG/1
500 TABLET, EXTENDED RELEASE ORAL
Qty: 90 TABLET | Refills: 0 | Status: SHIPPED | OUTPATIENT
Start: 2023-03-15

## 2023-03-15 NOTE — TELEPHONE ENCOUNTER
Patient states he is off work Friday and will get labs completed. He will schedule appointment after getting his labs.

## 2023-03-15 NOTE — TELEPHONE ENCOUNTER
Jaydon Oppenheim called to request a refill on his medication.       Last office visit : 8/31/2022   Next office visit : Visit date not found     Requested Prescriptions     Pending Prescriptions Disp Refills    metFORMIN (GLUCOPHAGE-XR) 500 MG extended release tablet [Pharmacy Med Name: METFORMIN HCL  MG TABLET] 90 tablet 1     Sig: Take 1 tablet by mouth daily (with breakfast) TAKE 1 TABLET BY MOUTH EVERY DAY [address], [place]

## 2023-03-17 DIAGNOSIS — E78.2 MIXED HYPERLIPIDEMIA: ICD-10-CM

## 2023-03-17 DIAGNOSIS — E11.9 TYPE 2 DIABETES MELLITUS WITHOUT COMPLICATION, WITHOUT LONG-TERM CURRENT USE OF INSULIN (HCC): ICD-10-CM

## 2023-03-17 LAB
ALBUMIN SERPL-MCNC: 4.4 G/DL (ref 3.5–5.2)
ALP SERPL-CCNC: 81 U/L (ref 40–130)
ALT SERPL-CCNC: 14 U/L (ref 5–41)
ANION GAP SERPL CALCULATED.3IONS-SCNC: 7 MMOL/L (ref 7–19)
AST SERPL-CCNC: 16 U/L (ref 5–40)
BILIRUB SERPL-MCNC: 1.2 MG/DL (ref 0.2–1.2)
BILIRUB UR QL STRIP: NEGATIVE
BUN SERPL-MCNC: 13 MG/DL (ref 6–20)
CALCIUM SERPL-MCNC: 9.6 MG/DL (ref 8.6–10)
CHLORIDE SERPL-SCNC: 101 MMOL/L (ref 98–111)
CHOLEST SERPL-MCNC: 185 MG/DL (ref 160–199)
CLARITY UR: CLEAR
CO2 SERPL-SCNC: 30 MMOL/L (ref 22–29)
COLOR UR: YELLOW
CREAT SERPL-MCNC: 0.8 MG/DL (ref 0.5–1.2)
CREAT UR-MCNC: 199.5 MG/DL (ref 4.2–622)
ERYTHROCYTE [DISTWIDTH] IN BLOOD BY AUTOMATED COUNT: 12.7 % (ref 11.5–14.5)
GLUCOSE SERPL-MCNC: 123 MG/DL (ref 74–109)
GLUCOSE UR STRIP.AUTO-MCNC: NEGATIVE MG/DL
HBA1C MFR BLD: 6.7 % (ref 4–6)
HCT VFR BLD AUTO: 48 % (ref 42–52)
HDLC SERPL-MCNC: 47 MG/DL (ref 55–121)
HGB BLD-MCNC: 16.5 G/DL (ref 14–18)
HGB UR STRIP.AUTO-MCNC: NEGATIVE MG/L
KETONES UR STRIP.AUTO-MCNC: NEGATIVE MG/DL
LDLC SERPL CALC-MCNC: 118 MG/DL
LEUKOCYTE ESTERASE UR QL STRIP.AUTO: NEGATIVE
MCH RBC QN AUTO: 28.7 PG (ref 27–31)
MCHC RBC AUTO-ENTMCNC: 34.4 G/DL (ref 33–37)
MCV RBC AUTO: 83.6 FL (ref 80–94)
MICROALBUMIN UR-MCNC: 1.4 MG/DL (ref 0–19)
MICROALBUMIN/CREAT UR-RTO: 7 MG/G
NITRITE UR QL STRIP.AUTO: NEGATIVE
PH UR STRIP.AUTO: 6 [PH] (ref 5–8)
PLATELET # BLD AUTO: 212 K/UL (ref 130–400)
PMV BLD AUTO: 10.8 FL (ref 9.4–12.4)
POTASSIUM SERPL-SCNC: 4.2 MMOL/L (ref 3.5–5)
PROT SERPL-MCNC: 7.3 G/DL (ref 6.6–8.7)
PROT UR STRIP.AUTO-MCNC: NEGATIVE MG/DL
RBC # BLD AUTO: 5.74 M/UL (ref 4.7–6.1)
SODIUM SERPL-SCNC: 138 MMOL/L (ref 136–145)
SP GR UR STRIP.AUTO: 1.02 (ref 1–1.03)
TRIGL SERPL-MCNC: 99 MG/DL (ref 0–149)
UROBILINOGEN UR STRIP.AUTO-MCNC: 0.2 E.U./DL
WBC # BLD AUTO: 5.9 K/UL (ref 4.8–10.8)

## 2023-03-20 NOTE — TELEPHONE ENCOUNTER
Last OV 1/11/2022  Next OV Visit date not found      Requested Prescriptions     Pending Prescriptions Disp Refills    fluticasone (FLONASE) 50 MCG/ACT nasal spray [Pharmacy Med Name: FLUTICASONE PROP 50 MCG SPRAY]       Sig: SPRAY 2 SPRAYS INTO EACH NOSTRIL EVERY DAY Yes

## 2023-03-24 ENCOUNTER — OFFICE VISIT (OUTPATIENT)
Dept: FAMILY MEDICINE CLINIC | Age: 53
End: 2023-03-24
Payer: COMMERCIAL

## 2023-03-24 VITALS
BODY MASS INDEX: 34.61 KG/M2 | SYSTOLIC BLOOD PRESSURE: 136 MMHG | OXYGEN SATURATION: 97 % | TEMPERATURE: 97.3 F | DIASTOLIC BLOOD PRESSURE: 82 MMHG | HEIGHT: 73 IN | HEART RATE: 69 BPM | WEIGHT: 261.13 LBS

## 2023-03-24 DIAGNOSIS — J34.89 POSTERIOR RHINORRHEA: ICD-10-CM

## 2023-03-24 DIAGNOSIS — F41.9 ANXIETY: ICD-10-CM

## 2023-03-24 DIAGNOSIS — N52.9 ERECTILE DYSFUNCTION, UNSPECIFIED ERECTILE DYSFUNCTION TYPE: ICD-10-CM

## 2023-03-24 DIAGNOSIS — E11.9 TYPE 2 DIABETES MELLITUS WITHOUT COMPLICATION, WITHOUT LONG-TERM CURRENT USE OF INSULIN (HCC): ICD-10-CM

## 2023-03-24 DIAGNOSIS — R35.0 URINARY FREQUENCY: ICD-10-CM

## 2023-03-24 DIAGNOSIS — Z56.6 STRESS AT WORK: ICD-10-CM

## 2023-03-24 PROCEDURE — 99214 OFFICE O/P EST MOD 30 MIN: CPT | Performed by: NURSE PRACTITIONER

## 2023-03-24 PROCEDURE — 3044F HG A1C LEVEL LT 7.0%: CPT | Performed by: NURSE PRACTITIONER

## 2023-03-24 RX ORDER — LEVOCETIRIZINE DIHYDROCHLORIDE 5 MG/1
5 TABLET, FILM COATED ORAL NIGHTLY
Qty: 90 TABLET | Refills: 3 | Status: SHIPPED | OUTPATIENT
Start: 2023-03-24

## 2023-03-24 RX ORDER — PRAVASTATIN SODIUM 20 MG
20 TABLET ORAL NIGHTLY
Qty: 90 TABLET | Refills: 1 | Status: SHIPPED | OUTPATIENT
Start: 2023-03-24

## 2023-03-24 RX ORDER — BLOOD-GLUCOSE SENSOR
EACH MISCELLANEOUS
Qty: 3 EACH | Refills: 5 | Status: SHIPPED | OUTPATIENT
Start: 2023-03-24

## 2023-03-24 RX ORDER — BLOOD-GLUCOSE,RECEIVER,CONT
EACH MISCELLANEOUS
Qty: 1 EACH | Refills: 1 | Status: SHIPPED | OUTPATIENT
Start: 2023-03-24

## 2023-03-24 RX ORDER — METFORMIN HYDROCHLORIDE 500 MG/1
500 TABLET, EXTENDED RELEASE ORAL
Qty: 90 TABLET | Refills: 3 | Status: SHIPPED | OUTPATIENT
Start: 2023-03-24

## 2023-03-24 RX ORDER — SILDENAFIL CITRATE 20 MG/1
TABLET ORAL
Qty: 60 TABLET | Refills: 5 | Status: SHIPPED | OUTPATIENT
Start: 2023-03-24

## 2023-03-24 RX ORDER — FLUTICASONE PROPIONATE 50 MCG
SPRAY, SUSPENSION (ML) NASAL
Qty: 1 EACH | Refills: 2 | Status: SHIPPED | OUTPATIENT
Start: 2023-03-24

## 2023-03-24 SDOH — ECONOMIC STABILITY: FOOD INSECURITY: WITHIN THE PAST 12 MONTHS, YOU WORRIED THAT YOUR FOOD WOULD RUN OUT BEFORE YOU GOT MONEY TO BUY MORE.: NEVER TRUE

## 2023-03-24 SDOH — ECONOMIC STABILITY: INCOME INSECURITY: HOW HARD IS IT FOR YOU TO PAY FOR THE VERY BASICS LIKE FOOD, HOUSING, MEDICAL CARE, AND HEATING?: NOT VERY HARD

## 2023-03-24 SDOH — ECONOMIC STABILITY: HOUSING INSECURITY
IN THE LAST 12 MONTHS, WAS THERE A TIME WHEN YOU DID NOT HAVE A STEADY PLACE TO SLEEP OR SLEPT IN A SHELTER (INCLUDING NOW)?: NO

## 2023-03-24 SDOH — HEALTH STABILITY - MENTAL HEALTH: OTHER PHYSICAL AND MENTAL STRAIN RELATED TO WORK: Z56.6

## 2023-03-24 SDOH — ECONOMIC STABILITY: FOOD INSECURITY: WITHIN THE PAST 12 MONTHS, THE FOOD YOU BOUGHT JUST DIDN'T LAST AND YOU DIDN'T HAVE MONEY TO GET MORE.: NEVER TRUE

## 2023-03-24 ASSESSMENT — PATIENT HEALTH QUESTIONNAIRE - PHQ9
SUM OF ALL RESPONSES TO PHQ QUESTIONS 1-9: 0
2. FEELING DOWN, DEPRESSED OR HOPELESS: 0
SUM OF ALL RESPONSES TO PHQ QUESTIONS 1-9: 0
SUM OF ALL RESPONSES TO PHQ9 QUESTIONS 1 & 2: 0
SUM OF ALL RESPONSES TO PHQ QUESTIONS 1-9: 0
SUM OF ALL RESPONSES TO PHQ QUESTIONS 1-9: 0
1. LITTLE INTEREST OR PLEASURE IN DOING THINGS: 0

## 2023-03-24 ASSESSMENT — ENCOUNTER SYMPTOMS: RESPIRATORY NEGATIVE: 1

## 2023-03-24 NOTE — PROGRESS NOTES
to light. Neck:      Trachea: No tracheal deviation. Cardiovascular:      Rate and Rhythm: Normal rate and regular rhythm. Heart sounds: Normal heart sounds. No murmur heard. Pulmonary:      Effort: Pulmonary effort is normal.      Breath sounds: Normal breath sounds. Musculoskeletal:      Cervical back: Neck supple. No rigidity or tenderness. Right lower leg: No edema. Left lower leg: No edema. Feet:      Right foot:      Protective Sensation: 5 sites tested. 5 sites sensed. Left foot:      Protective Sensation: 5 sites tested. 5 sites sensed. Comments: Overlapping left 4th/5th toes  Lymphadenopathy:      Cervical: No cervical adenopathy. Skin:     General: Skin is warm and dry. Capillary Refill: Capillary refill takes less than 2 seconds. Neurological:      General: No focal deficit present. Mental Status: He is alert and oriented to person, place, and time. Mental status is at baseline. Psychiatric:         Mood and Affect: Mood normal. Mood is not anxious or depressed. Affect is not angry. Speech: Speech normal.         Behavior: Behavior normal.         Thought Content: Thought content normal.         Judgment: Judgment normal.       /82   Pulse 69   Temp 97.3 °F (36.3 °C)   Ht 6' 1\" (1.854 m)   Wt 261 lb 2 oz (118.4 kg)   SpO2 97%   BMI 34.45 kg/m²      ASSESSMENT:      ICD-10-CM    1. Type 2 diabetes mellitus without complication, without long-term current use of insulin ([facility])  E11.9 Semaglutide, 1 MG/DOSE, 4 MG/3ML SOPN     metFORMIN (GLUCOPHAGE-XR) 500 MG extended release tablet     Continuous Blood Gluc Sensor (DEXCOM G7 SENSOR) MISC     Continuous Blood Gluc  (DEXCOM G7 ) MARLENE     pravastatin (PRAVACHOL) 20 MG tablet     CBC with Auto Differential     Comprehensive Metabolic Panel w/ Reflex to MG     Hemoglobin A1C     Lipid Panel      DIABETES FOOT EXAM     Diabetic Shoe      2.  Anxiety  F41.9 sertraline (ZOLOFT) 50

## 2023-05-11 ENCOUNTER — TELEPHONE (OUTPATIENT)
Dept: FAMILY MEDICINE CLINIC | Age: 53
End: 2023-05-11

## 2023-05-11 NOTE — TELEPHONE ENCOUNTER
[facility] pharmacy is requesting that you send in for 3ML pen of Ozempic. They said that the 1.5mL pen is no longer available. Please advise.

## 2023-05-12 NOTE — TELEPHONE ENCOUNTER
I see my order from 3/24/23 showing the 4mg/3ML solution sent. Please check and then inquire with pharmacy.

## 2023-06-05 NOTE — ANESTHESIA POSTPROCEDURE EVALUATION
Patient: Horacio Castañeda    Procedure Summary     Date: 03/08/21 Room / Location: [facility] ENDOSCOPY 4 / [facility] ENDOSCOPY    Anesthesia Start: 0750 Anesthesia Stop: 0811    Procedure: COLONOSCOPY (N/A ) Diagnosis:       Family history of colorectal cancer      (Family history of colorectal cancer [Z80.0])    Surgeons: Rosendo Friedman MD Provider: Randy Leon CRNA    Anesthesia Type: MAC ASA Status: 2          Anesthesia Type: MAC    Vitals  Vitals Value Taken Time   /86 03/08/21 0830   Temp     Pulse 71 03/08/21 0830   Resp 14 03/08/21 0830   SpO2 97 % 03/08/21 0830           Post Anesthesia Care and Evaluation    Patient location during evaluation: PHASE II  Patient participation: complete - patient participated  Level of consciousness: awake  Pain management: satisfactory to patient  Airway patency: patent  Anesthetic complications: No anesthetic complications  PONV Status: none  Cardiovascular status: acceptable  Respiratory status: acceptable  Hydration status: acceptable  No anesthesia care post op     No deformities present

## 2023-06-22 NOTE — TELEPHONE ENCOUNTER
Patient was only given the 7mg tablets and states that he is tolerating it well. Patient also wants to know in the future if/when Ozempic becomes available would he be able to go back on it.
Pharmacy called and states that they can't break open the box of Rybelsus 3mg to give 15 mg. They said that they can give the patient the whole box and than write instructions on the box to discard the remaining amount when he increases dosage.
Starting on 3 mg of Rybelsus once daily for 2 weeks. If they have to dispense a full month supply that is totally fine because after 2 weeks he can just take 2 of the 3 mg tablets to get to a 6 mg a day dose which is close to the next prescription that I had ordered at 7 mg. I then want him changing over to a 7 mg tablet once daily and I will have him stay on that dose until he calls and let me know if he is tolerating it and at that time I will likely increase him to the third dose up.
No

## 2023-07-25 DIAGNOSIS — N52.9 ERECTILE DYSFUNCTION, UNSPECIFIED ERECTILE DYSFUNCTION TYPE: ICD-10-CM

## 2023-07-25 DIAGNOSIS — R35.0 URINARY FREQUENCY: ICD-10-CM

## 2023-07-25 RX ORDER — SILDENAFIL CITRATE 20 MG/1
TABLET ORAL
Qty: 60 TABLET | Refills: 5 | Status: CANCELLED | OUTPATIENT
Start: 2023-07-25

## 2023-07-25 NOTE — TELEPHONE ENCOUNTER
Called patient and told him about the PA needing to be done and he said he always runs into this issue and would rather send it somewhere else. The medication is pended for [facility].

## 2023-07-25 NOTE — TELEPHONE ENCOUNTER
Called pharmacy about patients sildenafil because he said he was not able to refill this. The pharmacy said that it needed a PA. I have started this through my chart.

## 2023-07-26 RX ORDER — SILDENAFIL CITRATE 20 MG/1
TABLET ORAL
Qty: 60 TABLET | Refills: 5 | Status: SHIPPED | OUTPATIENT
Start: 2023-07-26

## 2023-08-18 DIAGNOSIS — E11.9 TYPE 2 DIABETES MELLITUS WITHOUT COMPLICATION, WITHOUT LONG-TERM CURRENT USE OF INSULIN (HCC): ICD-10-CM

## 2023-08-18 LAB
ALBUMIN SERPL-MCNC: 4.9 G/DL (ref 3.5–5.2)
ALP SERPL-CCNC: 78 U/L (ref 40–130)
ALT SERPL-CCNC: 16 U/L (ref 5–41)
ANION GAP SERPL CALCULATED.3IONS-SCNC: 14 MMOL/L (ref 7–19)
AST SERPL-CCNC: 16 U/L (ref 5–40)
BASOPHILS # BLD: 0.1 K/UL (ref 0–0.2)
BASOPHILS NFR BLD: 1.4 % (ref 0–1)
BILIRUB SERPL-MCNC: 1 MG/DL (ref 0.2–1.2)
BUN SERPL-MCNC: 14 MG/DL (ref 6–20)
CALCIUM SERPL-MCNC: 9.5 MG/DL (ref 8.6–10)
CHLORIDE SERPL-SCNC: 103 MMOL/L (ref 98–111)
CHOLEST SERPL-MCNC: 176 MG/DL (ref 160–199)
CO2 SERPL-SCNC: 25 MMOL/L (ref 22–29)
CREAT SERPL-MCNC: 0.9 MG/DL (ref 0.5–1.2)
EOSINOPHIL # BLD: 0.2 K/UL (ref 0–0.6)
EOSINOPHIL NFR BLD: 3.2 % (ref 0–5)
ERYTHROCYTE [DISTWIDTH] IN BLOOD BY AUTOMATED COUNT: 13.3 % (ref 11.5–14.5)
GLUCOSE SERPL-MCNC: 111 MG/DL (ref 74–109)
HBA1C MFR BLD: 5.9 % (ref 4–6)
HCT VFR BLD AUTO: 48.5 % (ref 42–52)
HDLC SERPL-MCNC: 45 MG/DL (ref 55–121)
HGB BLD-MCNC: 16.2 G/DL (ref 14–18)
IMM GRANULOCYTES # BLD: 0 K/UL
LDLC SERPL CALC-MCNC: 112 MG/DL
LYMPHOCYTES # BLD: 1.9 K/UL (ref 1.1–4.5)
LYMPHOCYTES NFR BLD: 28.9 % (ref 20–40)
MCH RBC QN AUTO: 29 PG (ref 27–31)
MCHC RBC AUTO-ENTMCNC: 33.4 G/DL (ref 33–37)
MCV RBC AUTO: 86.9 FL (ref 80–94)
MONOCYTES # BLD: 0.6 K/UL (ref 0–0.9)
MONOCYTES NFR BLD: 9.1 % (ref 0–10)
NEUTROPHILS # BLD: 3.7 K/UL (ref 1.5–7.5)
NEUTS SEG NFR BLD: 57.1 % (ref 50–65)
PLATELET # BLD AUTO: 202 K/UL (ref 130–400)
PMV BLD AUTO: 10.7 FL (ref 9.4–12.4)
POTASSIUM SERPL-SCNC: 4 MMOL/L (ref 3.5–5)
PROT SERPL-MCNC: 7.7 G/DL (ref 6.6–8.7)
RBC # BLD AUTO: 5.58 M/UL (ref 4.7–6.1)
SODIUM SERPL-SCNC: 142 MMOL/L (ref 136–145)
TRIGL SERPL-MCNC: 93 MG/DL (ref 0–149)
WBC # BLD AUTO: 6.5 K/UL (ref 4.8–10.8)

## 2023-10-12 ENCOUNTER — OFFICE VISIT (OUTPATIENT)
Dept: FAMILY MEDICINE CLINIC | Age: 53
End: 2023-10-12
Payer: COMMERCIAL

## 2023-10-12 VITALS
BODY MASS INDEX: 32.59 KG/M2 | RESPIRATION RATE: 17 BRPM | HEART RATE: 77 BPM | WEIGHT: 247 LBS | SYSTOLIC BLOOD PRESSURE: 152 MMHG | TEMPERATURE: 98 F | DIASTOLIC BLOOD PRESSURE: 104 MMHG | OXYGEN SATURATION: 96 %

## 2023-10-12 DIAGNOSIS — K52.9 ACUTE GASTROENTERITIS: ICD-10-CM

## 2023-10-12 DIAGNOSIS — R03.0 ELEVATED BLOOD PRESSURE READING: ICD-10-CM

## 2023-10-12 DIAGNOSIS — E11.9 TYPE 2 DIABETES MELLITUS WITHOUT COMPLICATION, WITHOUT LONG-TERM CURRENT USE OF INSULIN (HCC): ICD-10-CM

## 2023-10-12 DIAGNOSIS — K52.9 ACUTE GASTROENTERITIS: Primary | ICD-10-CM

## 2023-10-12 LAB
ADV 40+41 DNA STL QL NAA+NON-PROBE: NOT DETECTED
ALBUMIN SERPL-MCNC: 4.6 G/DL (ref 3.5–5.2)
ALP SERPL-CCNC: 90 U/L (ref 40–130)
ALT SERPL-CCNC: 13 U/L (ref 5–41)
ANION GAP SERPL CALCULATED.3IONS-SCNC: 9 MMOL/L (ref 7–19)
AST SERPL-CCNC: 12 U/L (ref 5–40)
BASOPHILS # BLD: 0.1 K/UL (ref 0–0.2)
BASOPHILS NFR BLD: 1 % (ref 0–1)
BILIRUB SERPL-MCNC: 1 MG/DL (ref 0.2–1.2)
BUN SERPL-MCNC: 13 MG/DL (ref 6–20)
C CAYETANENSIS DNA STL QL NAA+NON-PROBE: NOT DETECTED
C COLI+JEJ+UPSA DNA STL QL NAA+NON-PROBE: NOT DETECTED
C DIF TOX TCDA+TCDB STL QL NAA+NON-PROBE: NOT DETECTED
CALCIUM SERPL-MCNC: 10 MG/DL (ref 8.6–10)
CHLORIDE SERPL-SCNC: 100 MMOL/L (ref 98–111)
CO2 SERPL-SCNC: 26 MMOL/L (ref 22–29)
CREAT SERPL-MCNC: 0.9 MG/DL (ref 0.5–1.2)
CRYPTOSP DNA STL QL NAA+NON-PROBE: NOT DETECTED
E HISTOLYT DNA STL QL NAA+NON-PROBE: NOT DETECTED
EAEC PAA PLAS AGGR+AATA ST NAA+NON-PRB: NOT DETECTED
EC STX1+STX2 GENES STL QL NAA+NON-PROBE: NOT DETECTED
EOSINOPHIL # BLD: 0.33 K/UL (ref 0–0.6)
EOSINOPHIL NFR BLD: 4 % (ref 0–5)
EPEC EAE GENE STL QL NAA+NON-PROBE: NOT DETECTED
ERYTHROCYTE [DISTWIDTH] IN BLOOD BY AUTOMATED COUNT: 13.1 % (ref 11.5–14.5)
ETEC LTA+ST1A+ST1B TOX ST NAA+NON-PROBE: NOT DETECTED
G LAMBLIA DNA STL QL NAA+NON-PROBE: NOT DETECTED
GI PATH DNA+RNA PNL STL NAA+NON-PROBE: NOT DETECTED
GLUCOSE SERPL-MCNC: 108 MG/DL (ref 74–109)
HCT VFR BLD AUTO: 52.2 % (ref 42–52)
HGB BLD-MCNC: 17.3 G/DL (ref 14–18)
IMM GRANULOCYTES # BLD: 0 K/UL
LIPASE SERPL-CCNC: 17 U/L (ref 13–60)
LYMPHOCYTES # BLD: 2.2 K/UL (ref 1.1–4.5)
LYMPHOCYTES NFR BLD: 14 % (ref 20–40)
MCH RBC QN AUTO: 28.9 PG (ref 27–31)
MCHC RBC AUTO-ENTMCNC: 33.1 G/DL (ref 33–37)
MCV RBC AUTO: 87.3 FL (ref 80–94)
MONOCYTES # BLD: 1.6 K/UL (ref 0–0.9)
MONOCYTES NFR BLD: 19 % (ref 0–10)
MONONUC CELLS NFR BLD MANUAL: 1 %
NEUTROPHILS # BLD: 3.9 K/UL (ref 1.5–7.5)
NEUTS SEG NFR BLD: 48 % (ref 50–65)
NOROVIRUS GI+II RNA STL QL NAA+NON-PROBE: NOT DETECTED
P SHIGELLOIDES DNA STL QL NAA+NON-PROBE: NOT DETECTED
PLATELET # BLD AUTO: 240 K/UL (ref 130–400)
PLATELET SLIDE REVIEW: ADEQUATE
PMV BLD AUTO: 10.6 FL (ref 9.4–12.4)
POTASSIUM SERPL-SCNC: 3.5 MMOL/L (ref 3.5–5)
PROT SERPL-MCNC: 8 G/DL (ref 6.6–8.7)
RBC # BLD AUTO: 5.98 M/UL (ref 4.7–6.1)
RVA RNA STL QL NAA+NON-PROBE: NOT DETECTED
S ENT+BONG DNA STL QL NAA+NON-PROBE: NOT DETECTED
SAPO I+II+IV+V RNA STL QL NAA+NON-PROBE: NOT DETECTED
SHIGELLA SP+EIEC IPAH ST NAA+NON-PROBE: NOT DETECTED
SODIUM SERPL-SCNC: 135 MMOL/L (ref 136–145)
V CHOL+PARA+VUL DNA STL QL NAA+NON-PROBE: NOT DETECTED
V CHOLERAE DNA STL QL NAA+NON-PROBE: NOT DETECTED
VARIANT LYMPHS NFR BLD: 13 % (ref 0–8)
WBC # BLD AUTO: 8.2 K/UL (ref 4.8–10.8)
Y ENTEROCOL DNA STL QL NAA+NON-PROBE: NOT DETECTED

## 2023-10-12 PROCEDURE — 99214 OFFICE O/P EST MOD 30 MIN: CPT | Performed by: CLINICAL NURSE SPECIALIST

## 2023-10-12 PROCEDURE — 3044F HG A1C LEVEL LT 7.0%: CPT | Performed by: CLINICAL NURSE SPECIALIST

## 2023-10-12 RX ORDER — ONDANSETRON 4 MG/1
4 TABLET, ORALLY DISINTEGRATING ORAL 3 TIMES DAILY PRN
Qty: 30 TABLET | Refills: 0 | Status: SHIPPED | OUTPATIENT
Start: 2023-10-12

## 2023-10-12 ASSESSMENT — ENCOUNTER SYMPTOMS
SORE THROAT: 0
NAUSEA: 1
ABDOMINAL PAIN: 1
SHORTNESS OF BREATH: 0
EYE DISCHARGE: 0
WHEEZING: 0
VOMITING: 1
CHEST TIGHTNESS: 0
EYE PAIN: 0
DIARRHEA: 1
RHINORRHEA: 0
ANAL BLEEDING: 0
SINUS PRESSURE: 0
FACIAL SWELLING: 0
COUGH: 0
BLOOD IN STOOL: 0

## 2024-01-18 ENCOUNTER — OFFICE VISIT (OUTPATIENT)
Dept: FAMILY MEDICINE CLINIC | Age: 54
End: 2024-01-18
Payer: COMMERCIAL

## 2024-01-18 VITALS
RESPIRATION RATE: 17 BRPM | WEIGHT: 245 LBS | DIASTOLIC BLOOD PRESSURE: 90 MMHG | SYSTOLIC BLOOD PRESSURE: 126 MMHG | HEART RATE: 80 BPM | OXYGEN SATURATION: 97 % | BODY MASS INDEX: 32.32 KG/M2 | TEMPERATURE: 98.7 F

## 2024-01-18 DIAGNOSIS — R52 GENERALIZED BODY ACHES: ICD-10-CM

## 2024-01-18 DIAGNOSIS — B34.9 VIRAL ILLNESS: Primary | ICD-10-CM

## 2024-01-18 LAB
INFLUENZA A ANTIBODY: NORMAL
INFLUENZA B ANTIBODY: NORMAL
SARS-COV-2 N GENE RESP QL NAA+PROBE: NOT DETECTED

## 2024-01-18 PROCEDURE — 99213 OFFICE O/P EST LOW 20 MIN: CPT | Performed by: CLINICAL NURSE SPECIALIST

## 2024-01-18 RX ORDER — METHYLPREDNISOLONE 4 MG/1
TABLET ORAL
Qty: 21 TABLET | Refills: 0 | Status: SHIPPED | OUTPATIENT
Start: 2024-01-18 | End: 2024-01-24

## 2024-01-18 ASSESSMENT — ENCOUNTER SYMPTOMS
COUGH: 1
SINUS PRESSURE: 0
NAUSEA: 0
WHEEZING: 1
EYE PAIN: 0
SORE THROAT: 0
VOMITING: 0
FACIAL SWELLING: 0
CHEST TIGHTNESS: 0
SHORTNESS OF BREATH: 0
RHINORRHEA: 1
EYE DISCHARGE: 0

## 2024-01-18 ASSESSMENT — PATIENT HEALTH QUESTIONNAIRE - PHQ9
1. LITTLE INTEREST OR PLEASURE IN DOING THINGS: 0
2. FEELING DOWN, DEPRESSED OR HOPELESS: 0
SUM OF ALL RESPONSES TO PHQ QUESTIONS 1-9: 0
SUM OF ALL RESPONSES TO PHQ9 QUESTIONS 1 & 2: 0
SUM OF ALL RESPONSES TO PHQ QUESTIONS 1-9: 0

## 2024-01-18 NOTE — PROGRESS NOTES
SUBJECTIVE:  Ruben Marie is a 53 y.o. who presents today for Cough, Congestion, Fever, and Headache (Negative covid test)      HPI    Ruben presents today for an acute visit.  His symptoms started late Sunday into Monday with dry cough. He has had headache, fever and runny nose.   Notes some mild wheezing when laying down at night.  Max temp 100.6  Taking otc cough and cold medications with minimal relief.  Was at [facility] over the weekend.  At home COVID test negative on Monday.    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 21 tablet 0    ondansetron (ZOFRAN-ODT) 4 MG disintegrating tablet Take 1 tablet by mouth 3 times daily as needed for Nausea or Vomiting 30 tablet 0    sildenafil (REVATIO) 20 MG tablet 1-5 po 30-60mins before sex 60 tablet 5    Semaglutide, 1 MG/DOSE, 4 MG/3ML SOPN 1mg sub-q weekly 9 mL 1    metFORMIN (GLUCOPHAGE-XR) 500 MG extended release tablet Take 1 tablet by mouth daily (with breakfast) TAKE 1 TABLET BY MOUTH EVERY DAY WITH BREAKFAST 90 tablet 3    sertraline (ZOLOFT) 50 MG tablet 1 TABLET DAILY TAKE WITH FOOD 90 tablet 1    levocetirizine (XYZAL) 5 MG tablet Take 1 tablet by mouth nightly 90 tablet 3    fluticasone (FLONASE) 50 MCG/ACT nasal spray SPRAY 2 SPRAYS INTO EACH NOSTRIL EVERY DAY 1 each 2    Continuous Blood Gluc Sensor (DEXCOM G7 SENSOR) MISC For continuous monitoring 3 each 5    Continuous Blood Gluc

## 2024-04-01 DIAGNOSIS — E11.9 TYPE 2 DIABETES MELLITUS WITHOUT COMPLICATION, WITHOUT LONG-TERM CURRENT USE OF INSULIN (HCC): Primary | ICD-10-CM

## 2024-04-01 DIAGNOSIS — Z12.5 PROSTATE CANCER SCREENING: ICD-10-CM

## 2024-04-01 DIAGNOSIS — E78.2 MIXED HYPERLIPIDEMIA: ICD-10-CM

## 2024-04-29 ENCOUNTER — TELEPHONE (OUTPATIENT)
Dept: FAMILY MEDICINE CLINIC | Age: 54
End: 2024-04-29

## 2024-04-29 DIAGNOSIS — Z56.6 STRESS AT WORK: ICD-10-CM

## 2024-04-29 DIAGNOSIS — R35.0 URINARY FREQUENCY: ICD-10-CM

## 2024-04-29 DIAGNOSIS — N52.9 ERECTILE DYSFUNCTION, UNSPECIFIED ERECTILE DYSFUNCTION TYPE: ICD-10-CM

## 2024-04-29 DIAGNOSIS — F41.9 ANXIETY: ICD-10-CM

## 2024-04-29 SDOH — HEALTH STABILITY - MENTAL HEALTH: OTHER PHYSICAL AND MENTAL STRAIN RELATED TO WORK: Z56.6

## 2024-04-30 RX ORDER — SILDENAFIL CITRATE 20 MG/1
TABLET ORAL
Qty: 60 TABLET | Refills: 5 | Status: CANCELLED | OUTPATIENT
Start: 2024-04-30

## 2024-04-30 NOTE — TELEPHONE ENCOUNTER
Ruben Marie called to request a refill on his medication.      Last office visit : 2024   Next office visit : Visit date not found     Requested Prescriptions     Pending Prescriptions Disp Refills    sertraline (ZOLOFT) 50 MG tablet 90 tablet 1     Si TABLET DAILY TAKE WITH FOOD    sildenafil (REVATIO) 20 MG tablet 60 tablet 5     Si-5 po 30-60mins before sex1-5 po 30-60mins before sex            Shira Bonilla MA

## 2024-05-01 RX ORDER — SILDENAFIL 50 MG/1
TABLET, FILM COATED ORAL
Qty: 30 TABLET | Refills: 2 | Status: SHIPPED | OUTPATIENT
Start: 2024-05-01

## 2024-06-21 DIAGNOSIS — E78.2 MIXED HYPERLIPIDEMIA: ICD-10-CM

## 2024-06-21 DIAGNOSIS — E11.9 TYPE 2 DIABETES MELLITUS WITHOUT COMPLICATION, WITHOUT LONG-TERM CURRENT USE OF INSULIN (HCC): ICD-10-CM

## 2024-06-21 DIAGNOSIS — Z12.5 PROSTATE CANCER SCREENING: ICD-10-CM

## 2024-06-21 LAB
ALBUMIN SERPL-MCNC: 4.5 G/DL (ref 3.5–5.2)
ALP SERPL-CCNC: 88 U/L (ref 40–130)
ALT SERPL-CCNC: 15 U/L (ref 5–41)
ANION GAP SERPL CALCULATED.3IONS-SCNC: 11 MMOL/L (ref 7–19)
AST SERPL-CCNC: 14 U/L (ref 5–40)
BASOPHILS # BLD: 0.1 K/UL (ref 0–0.2)
BASOPHILS NFR BLD: 1.7 % (ref 0–1)
BILIRUB SERPL-MCNC: 1.2 MG/DL (ref 0.2–1.2)
BILIRUB UR QL STRIP: NEGATIVE
BUN SERPL-MCNC: 13 MG/DL (ref 6–20)
CALCIUM SERPL-MCNC: 9.2 MG/DL (ref 8.6–10)
CHLORIDE SERPL-SCNC: 102 MMOL/L (ref 98–111)
CHOLEST SERPL-MCNC: 187 MG/DL (ref 160–199)
CLARITY UR: CLEAR
CO2 SERPL-SCNC: 25 MMOL/L (ref 22–29)
COLOR UR: YELLOW
CREAT SERPL-MCNC: 0.9 MG/DL (ref 0.5–1.2)
CREAT UR-MCNC: 213.7 MG/DL (ref 39–259)
EOSINOPHIL # BLD: 0.2 K/UL (ref 0–0.6)
EOSINOPHIL NFR BLD: 2.8 % (ref 0–5)
ERYTHROCYTE [DISTWIDTH] IN BLOOD BY AUTOMATED COUNT: 13.2 % (ref 11.5–14.5)
GLUCOSE SERPL-MCNC: 124 MG/DL (ref 74–109)
GLUCOSE UR STRIP.AUTO-MCNC: NEGATIVE MG/DL
HBA1C MFR BLD: 7.2 % (ref 4–6)
HCT VFR BLD AUTO: 47.3 % (ref 42–52)
HDLC SERPL-MCNC: 47 MG/DL (ref 55–121)
HGB BLD-MCNC: 15.8 G/DL (ref 14–18)
HGB UR STRIP.AUTO-MCNC: NEGATIVE MG/L
IMM GRANULOCYTES # BLD: 0 K/UL
KETONES UR STRIP.AUTO-MCNC: NEGATIVE MG/DL
LDLC SERPL CALC-MCNC: 128 MG/DL
LEUKOCYTE ESTERASE UR QL STRIP.AUTO: NEGATIVE
LYMPHOCYTES # BLD: 1.9 K/UL (ref 1.1–4.5)
LYMPHOCYTES NFR BLD: 33.7 % (ref 20–40)
MCH RBC QN AUTO: 28.7 PG (ref 27–31)
MCHC RBC AUTO-ENTMCNC: 33.4 G/DL (ref 33–37)
MCV RBC AUTO: 86 FL (ref 80–94)
MICROALBUMIN UR-MCNC: <1.2 MG/DL (ref 0–19)
MICROALBUMIN/CREAT UR-RTO: NORMAL MG/G
MONOCYTES # BLD: 0.5 K/UL (ref 0–0.9)
MONOCYTES NFR BLD: 9.1 % (ref 0–10)
NEUTROPHILS # BLD: 3 K/UL (ref 1.5–7.5)
NEUTS SEG NFR BLD: 52.5 % (ref 50–65)
NITRITE UR QL STRIP.AUTO: NEGATIVE
PH UR STRIP.AUTO: 6.5 [PH] (ref 5–8)
PLATELET # BLD AUTO: 201 K/UL (ref 130–400)
PMV BLD AUTO: 10.9 FL (ref 9.4–12.4)
POTASSIUM SERPL-SCNC: 4 MMOL/L (ref 3.5–5)
PROT SERPL-MCNC: 7 G/DL (ref 6.6–8.7)
PROT UR STRIP.AUTO-MCNC: NEGATIVE MG/DL
PSA SERPL-MCNC: 1.11 NG/ML (ref 0–4)
RBC # BLD AUTO: 5.5 M/UL (ref 4.7–6.1)
SODIUM SERPL-SCNC: 138 MMOL/L (ref 136–145)
SP GR UR STRIP.AUTO: 1.02 (ref 1–1.03)
TRIGL SERPL-MCNC: 62 MG/DL (ref 0–149)
TSH SERPL DL<=0.005 MIU/L-ACNC: 1.98 UIU/ML (ref 0.27–4.2)
UROBILINOGEN UR STRIP.AUTO-MCNC: 1 E.U./DL
VIT B12 SERPL-MCNC: 639 PG/ML (ref 211–946)
WBC # BLD AUTO: 5.7 K/UL (ref 4.8–10.8)

## 2024-07-10 ENCOUNTER — OFFICE VISIT (OUTPATIENT)
Dept: FAMILY MEDICINE CLINIC | Age: 54
End: 2024-07-10
Payer: COMMERCIAL

## 2024-07-10 VITALS
BODY MASS INDEX: 34.72 KG/M2 | HEIGHT: 73 IN | TEMPERATURE: 97.7 F | OXYGEN SATURATION: 98 % | WEIGHT: 262 LBS | SYSTOLIC BLOOD PRESSURE: 142 MMHG | RESPIRATION RATE: 20 BRPM | DIASTOLIC BLOOD PRESSURE: 82 MMHG | HEART RATE: 60 BPM

## 2024-07-10 DIAGNOSIS — R35.0 URINARY FREQUENCY: ICD-10-CM

## 2024-07-10 DIAGNOSIS — N52.9 ERECTILE DYSFUNCTION, UNSPECIFIED ERECTILE DYSFUNCTION TYPE: ICD-10-CM

## 2024-07-10 DIAGNOSIS — E11.65 TYPE 2 DIABETES MELLITUS WITH HYPERGLYCEMIA, WITHOUT LONG-TERM CURRENT USE OF INSULIN (HCC): Primary | ICD-10-CM

## 2024-07-10 PROCEDURE — 99214 OFFICE O/P EST MOD 30 MIN: CPT | Performed by: NURSE PRACTITIONER

## 2024-07-10 PROCEDURE — 3051F HG A1C>EQUAL 7.0%<8.0%: CPT | Performed by: NURSE PRACTITIONER

## 2024-07-10 RX ORDER — SILDENAFIL CITRATE 20 MG/1
TABLET ORAL
Qty: 90 TABLET | Refills: 1 | Status: SHIPPED | OUTPATIENT
Start: 2024-07-10

## 2024-07-10 RX ORDER — METFORMIN HYDROCHLORIDE 500 MG/1
500 TABLET, EXTENDED RELEASE ORAL
Qty: 90 TABLET | Refills: 3 | Status: SHIPPED | OUTPATIENT
Start: 2024-07-10

## 2024-07-10 SDOH — ECONOMIC STABILITY: INCOME INSECURITY: HOW HARD IS IT FOR YOU TO PAY FOR THE VERY BASICS LIKE FOOD, HOUSING, MEDICAL CARE, AND HEATING?: NOT HARD AT ALL

## 2024-07-10 SDOH — ECONOMIC STABILITY: FOOD INSECURITY: WITHIN THE PAST 12 MONTHS, THE FOOD YOU BOUGHT JUST DIDN'T LAST AND YOU DIDN'T HAVE MONEY TO GET MORE.: NEVER TRUE

## 2024-07-10 SDOH — ECONOMIC STABILITY: FOOD INSECURITY: WITHIN THE PAST 12 MONTHS, YOU WORRIED THAT YOUR FOOD WOULD RUN OUT BEFORE YOU GOT MONEY TO BUY MORE.: NEVER TRUE

## 2024-07-10 NOTE — PROGRESS NOTES
Labs (Patient presents to discuss A1c test results. )  Fasting lab 3mo  Change ozempic to mounjaro  Recheck BP  Ct calcium scoring recommended--give number to [facility]  Further orders once next lab is seen.  Considering statin a bit more/again.      Diagnosis and orders for this visit are above.      Please note that this chart was generated using dragon dictationsoftware.  Although every effort was made to ensure the accuracy of this automated transcription, some errors in transcription may have occurred.

## 2024-07-12 ASSESSMENT — ENCOUNTER SYMPTOMS: RESPIRATORY NEGATIVE: 1

## 2024-08-26 ENCOUNTER — OFFICE VISIT (OUTPATIENT)
Dept: PRIMARY CARE CLINIC | Age: 54
End: 2024-08-26
Payer: COMMERCIAL

## 2024-08-26 VITALS
WEIGHT: 261 LBS | BODY MASS INDEX: 34.43 KG/M2 | TEMPERATURE: 97.2 F | DIASTOLIC BLOOD PRESSURE: 72 MMHG | SYSTOLIC BLOOD PRESSURE: 128 MMHG | OXYGEN SATURATION: 96 % | HEART RATE: 72 BPM

## 2024-08-26 DIAGNOSIS — R10.9 FLANK PAIN: Primary | ICD-10-CM

## 2024-08-26 DIAGNOSIS — R31.0 GROSS HEMATURIA: ICD-10-CM

## 2024-08-26 DIAGNOSIS — R30.0 DYSURIA: ICD-10-CM

## 2024-08-26 LAB
APPEARANCE FLUID: CLEAR
BILIRUBIN, POC: NORMAL
BLOOD URINE, POC: NORMAL
CLARITY, POC: CLEAR
COLOR, POC: NORMAL
GLUCOSE URINE, POC: NORMAL
KETONES, POC: NORMAL
LEUKOCYTE EST, POC: NORMAL
NITRITE, POC: NORMAL
PH, POC: 5.5
PROTEIN, POC: NORMAL
SPECIFIC GRAVITY, POC: 1.02
UROBILINOGEN, POC: 0.2

## 2024-08-26 PROCEDURE — 81002 URINALYSIS NONAUTO W/O SCOPE: CPT | Performed by: NURSE PRACTITIONER

## 2024-08-26 PROCEDURE — 99213 OFFICE O/P EST LOW 20 MIN: CPT | Performed by: NURSE PRACTITIONER

## 2024-08-26 ASSESSMENT — ENCOUNTER SYMPTOMS
ABDOMINAL PAIN: 1
EYE DISCHARGE: 0
BLOOD IN STOOL: 0
WHEEZING: 0
VOMITING: 0
RHINORRHEA: 0
EYE ITCHING: 0
DIARRHEA: 0
SINUS PRESSURE: 0
SORE THROAT: 0
BACK PAIN: 1
COLOR CHANGE: 0
NAUSEA: 0
SHORTNESS OF BREATH: 0
COUGH: 0
CONSTIPATION: 0

## 2024-08-26 NOTE — PROGRESS NOTES
[facility]  [facility] [address]  [address]  [place] [address]  Dept: [phone]  Dept Fax: [phone]  Loc: [phone]    Ruben Marie is a 53 y.o. male who presents today for his medical conditions/complaints as noted below.  Ruben Marie is complaining of Lower Back Pain, Abdominal Pain (Right lower quad), and Hematuria        HPI:   Reports he was having notable blood in his urine over the past couple of days but that has since resolved. History of kidney stones. Has been increasing fluids and has noticed symptom relief, but wanted to rule out infection today.    Flank Pain  This is a new problem. The current episode started in the past 7 days (2-3 days ago). The problem occurs intermittently. The problem has been waxing and waning since onset. The quality of the pain is described as aching. The pain does not radiate. The pain is mild. Associated symptoms include abdominal pain (generalized). Pertinent negatives include no chest pain, dysuria, fever or headaches. He has tried nothing for the symptoms.       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 - Was told in ER       Past Surgical History:   Procedure Laterality Date    CHOLECYSTECTOMY         Family History   Problem Relation Age of Onset    Cancer Mother         colon     Other Father         kidney stone        Social History     Tobacco Use    Smoking status: Never    Smokeless tobacco: Never   Substance Use Topics    Alcohol use: No        Current Outpatient Medications   Medication Sig Dispense Refill    metFORMIN (GLUCOPHAGE-XR) 500 MG extended release tablet Take 1 tablet by mouth daily (with breakfast) TAKE 1 TABLET BY MOUTH EVERY DAY WITH BREAKFAST 90 tablet 3    sildenafil (REVATIO) 20 MG tablet 1-5 po 30-60mins before sex 90 tablet 1    sertraline (ZOLOFT) 50 MG tablet 1 TABLET DAILY TAKE WITH FOOD 90  ER immediately.  - Increase water intake  - Strain urine and monitor for passing of stones  - The patient is to follow up with PCP or return to clinic if symptoms worsen/fail to improve.    Urgent Care evaluation today is not a substitute for a PCP visit. Follow up care is your responsibility to discuss and review this UC visit with your PCP.     Orders Placed This Encounter   Procedures    POCT Urinalysis no Micro     Results for orders placed or performed in visit on 08/26/24   POCT Urinalysis no Micro   Result Value Ref Range    Color, UA jennifer     Clarity, UA clear     Glucose, UA POC neg     Bilirubin, UA small     Ketones, UA neg     Spec Grav, UA 1.020     Blood, UA POC large     pH, UA 5.5     Protein, UA POC 30 mg     Urobilinogen, UA 0.2     Leukocytes, UA neg     Nitrite, UA neg     Appearance, Fluid Clear Clear, Slightly Cloudy     Return if symptoms worsen or fail to improve.         Discussed usage, benefits, and side effects of any administered or prescribed medications. All questions were answered regarding evaluation, diagnosis, and treatment plan done during today's visit. Patient was strongly encouraged to follow up with PCP within the next few days to be re-evaluated for improvement in symptoms or for any other questions. Return precautions for worsening of the condition or development of new concerning symptoms discussed. Patient and/or guardian was given educational information, verbalized understanding, and is in agreement with treatment plan.   Patient was given educational materials - see patient instructions below.     Patient Instructions   - Increase water intake  - Strain urine and monitor for passing of stones  - The patient is to follow up with PCP or return to clinic if symptoms worsen/fail to improve.      Electronically signed by DEVIN Sullivan CNP on 8/26/2024 at 1:10 PM

## 2024-08-26 NOTE — PATIENT INSTRUCTIONS
- Increase water intake  - Strain urine and monitor for passing of stones  - The patient is to follow up with PCP or return to clinic if symptoms worsen/fail to improve.

## 2024-09-07 DIAGNOSIS — E11.65 TYPE 2 DIABETES MELLITUS WITH HYPERGLYCEMIA, WITHOUT LONG-TERM CURRENT USE OF INSULIN (HCC): ICD-10-CM

## 2024-09-11 RX ORDER — SEMAGLUTIDE 0.68 MG/ML
INJECTION, SOLUTION SUBCUTANEOUS
Refills: 1 | OUTPATIENT
Start: 2024-09-11

## 2024-10-15 ENCOUNTER — TELEPHONE (OUTPATIENT)
Dept: FAMILY MEDICINE CLINIC | Age: 54
End: 2024-10-15

## 2024-10-15 NOTE — TELEPHONE ENCOUNTER
Unable to reach patient at this time in regards to patients ct calcium results. Left patient a VM and will retry patient again tomorrow.

## 2024-10-16 ENCOUNTER — TELEPHONE (OUTPATIENT)
Dept: FAMILY MEDICINE CLINIC | Age: 54
End: 2024-10-16

## 2024-10-16 DIAGNOSIS — E11.65 TYPE 2 DIABETES MELLITUS WITH HYPERGLYCEMIA, WITHOUT LONG-TERM CURRENT USE OF INSULIN (HCC): Primary | ICD-10-CM

## 2024-10-16 RX ORDER — TIRZEPATIDE 5 MG/.5ML
5 INJECTION, SOLUTION SUBCUTANEOUS WEEKLY
Qty: 2 ML | Refills: 3 | Status: SHIPPED | OUTPATIENT
Start: 2024-10-16

## 2024-10-16 NOTE — TELEPHONE ENCOUNTER
I have increased dose as he requested.  3mo diabetic visit will be needed at that time--come fasting for needed lab at that time. Also, I have resulted the CT calcium scoring (on paper).

## 2024-10-16 NOTE — TELEPHONE ENCOUNTER
Patient has called wanting to increase his Mounjaro to the next dosage. He has lost \"a few pounds.\" Would you send over the next dosage please?

## 2024-10-16 NOTE — TELEPHONE ENCOUNTER
Patient is aware of his ct calcium report score and is aware of Cathy's recommendations. Patient states he would like to hold off on the cardiology referral for now and would like to talk to his wife first. Patient states he will send us a message and let us know who he would like to see for cardiology if he decides to go that route.

## 2024-11-04 DIAGNOSIS — F41.9 ANXIETY: ICD-10-CM

## 2024-11-04 DIAGNOSIS — Z56.6 STRESS AT WORK: ICD-10-CM

## 2024-11-04 SDOH — HEALTH STABILITY - MENTAL HEALTH: OTHER PHYSICAL AND MENTAL STRAIN RELATED TO WORK: Z56.6

## 2024-11-04 NOTE — TELEPHONE ENCOUNTER
Ruben Marie called to request a refill on his medication.      Last office visit : 7/10/2024   Next office visit : Visit date not found     Requested Prescriptions     Pending Prescriptions Disp Refills    sertraline (ZOLOFT) 50 MG tablet [Pharmacy Med Name: Sertraline HCl 50 MG Oral Tablet] 90 tablet 0     Sig: Take 1 tablet by mouth once daily with food            Olga Soler LPN

## 2024-11-11 DIAGNOSIS — E11.9 TYPE 2 DIABETES MELLITUS WITHOUT COMPLICATION, WITHOUT LONG-TERM CURRENT USE OF INSULIN (HCC): ICD-10-CM

## 2024-11-11 DIAGNOSIS — E11.65 TYPE 2 DIABETES MELLITUS WITH HYPERGLYCEMIA, WITHOUT LONG-TERM CURRENT USE OF INSULIN (HCC): ICD-10-CM

## 2024-11-12 RX ORDER — ACYCLOVIR 400 MG/1
TABLET ORAL
Qty: 3 EACH | Refills: 5 | Status: SHIPPED | OUTPATIENT
Start: 2024-11-12

## 2024-11-12 RX ORDER — METFORMIN HYDROCHLORIDE 500 MG/1
500 TABLET, EXTENDED RELEASE ORAL
Qty: 90 TABLET | Refills: 3 | Status: SHIPPED | OUTPATIENT
Start: 2024-11-12

## 2024-12-06 DIAGNOSIS — J06.9 ACUTE URI: Primary | ICD-10-CM

## 2024-12-06 RX ORDER — DOXYCYCLINE HYCLATE 100 MG
100 TABLET ORAL 2 TIMES DAILY
Qty: 20 TABLET | Refills: 0 | Status: SHIPPED | OUTPATIENT
Start: 2024-12-06 | End: 2024-12-16

## 2024-12-06 RX ORDER — BROMPHENIRAMINE MALEATE, PSEUDOEPHEDRINE HYDROCHLORIDE, AND DEXTROMETHORPHAN HYDROBROMIDE 2; 30; 10 MG/5ML; MG/5ML; MG/5ML
5 SYRUP ORAL 4 TIMES DAILY PRN
Qty: 120 ML | Refills: 0 | Status: SHIPPED | OUTPATIENT
Start: 2024-12-06

## 2024-12-06 NOTE — PROGRESS NOTES
Pt reporting cough and congestion over 1wk.  Spouse sick 2wks ago with similar.  Unable to come for visit today.

## 2024-12-16 ENCOUNTER — OFFICE VISIT (OUTPATIENT)
Dept: FAMILY MEDICINE CLINIC | Age: 54
End: 2024-12-16
Payer: COMMERCIAL

## 2024-12-16 VITALS
BODY MASS INDEX: 34.06 KG/M2 | HEIGHT: 73 IN | TEMPERATURE: 98 F | SYSTOLIC BLOOD PRESSURE: 126 MMHG | WEIGHT: 257 LBS | OXYGEN SATURATION: 97 % | RESPIRATION RATE: 14 BRPM | HEART RATE: 70 BPM | DIASTOLIC BLOOD PRESSURE: 82 MMHG

## 2024-12-16 DIAGNOSIS — E11.65 TYPE 2 DIABETES MELLITUS WITH HYPERGLYCEMIA, WITHOUT LONG-TERM CURRENT USE OF INSULIN (HCC): Primary | ICD-10-CM

## 2024-12-16 DIAGNOSIS — E11.65 TYPE 2 DIABETES MELLITUS WITH HYPERGLYCEMIA, WITHOUT LONG-TERM CURRENT USE OF INSULIN (HCC): ICD-10-CM

## 2024-12-16 DIAGNOSIS — Z13.21 ENCOUNTER FOR VITAMIN DEFICIENCY SCREENING: ICD-10-CM

## 2024-12-16 DIAGNOSIS — F41.9 ANXIETY: ICD-10-CM

## 2024-12-16 DIAGNOSIS — N52.9 ERECTILE DYSFUNCTION, UNSPECIFIED ERECTILE DYSFUNCTION TYPE: ICD-10-CM

## 2024-12-16 DIAGNOSIS — R35.0 URINARY FREQUENCY: ICD-10-CM

## 2024-12-16 DIAGNOSIS — Z56.6 STRESS AT WORK: ICD-10-CM

## 2024-12-16 LAB
25(OH)D3 SERPL-MCNC: 39.3 NG/ML
ALBUMIN SERPL-MCNC: 4.5 G/DL (ref 3.5–5.2)
ALP SERPL-CCNC: 83 U/L (ref 40–129)
ALT SERPL-CCNC: 13 U/L (ref 5–41)
ANION GAP SERPL CALCULATED.3IONS-SCNC: 13 MMOL/L (ref 7–19)
AST SERPL-CCNC: 15 U/L (ref 5–40)
BASOPHILS # BLD: 0.1 K/UL (ref 0–0.2)
BASOPHILS NFR BLD: 1.4 % (ref 0–1)
BILIRUB SERPL-MCNC: 0.9 MG/DL (ref 0.2–1.2)
BUN SERPL-MCNC: 14 MG/DL (ref 6–20)
CALCIUM SERPL-MCNC: 9.6 MG/DL (ref 8.6–10)
CHLORIDE SERPL-SCNC: 101 MMOL/L (ref 98–111)
CHOLEST SERPL-MCNC: 179 MG/DL (ref 0–199)
CO2 SERPL-SCNC: 27 MMOL/L (ref 22–29)
CREAT SERPL-MCNC: 0.8 MG/DL (ref 0.7–1.2)
EOSINOPHIL # BLD: 0.3 K/UL (ref 0–0.6)
EOSINOPHIL NFR BLD: 3 % (ref 0–5)
ERYTHROCYTE [DISTWIDTH] IN BLOOD BY AUTOMATED COUNT: 12.9 % (ref 11.5–14.5)
GLUCOSE SERPL-MCNC: 107 MG/DL (ref 70–99)
HBA1C MFR BLD: 6.8 % (ref 4–5.6)
HCT VFR BLD AUTO: 49.6 % (ref 42–52)
HDLC SERPL-MCNC: 42 MG/DL (ref 40–60)
HGB BLD-MCNC: 16.7 G/DL (ref 14–18)
IMM GRANULOCYTES # BLD: 0 K/UL
LDLC SERPL CALC-MCNC: 115 MG/DL
LYMPHOCYTES # BLD: 2.9 K/UL (ref 1.1–4.5)
LYMPHOCYTES NFR BLD: 35 % (ref 20–40)
MCH RBC QN AUTO: 28.3 PG (ref 27–31)
MCHC RBC AUTO-ENTMCNC: 33.7 G/DL (ref 33–37)
MCV RBC AUTO: 84.1 FL (ref 80–94)
MONOCYTES # BLD: 0.6 K/UL (ref 0–0.9)
MONOCYTES NFR BLD: 7.4 % (ref 0–10)
NEUTROPHILS # BLD: 4.4 K/UL (ref 1.5–7.5)
NEUTS SEG NFR BLD: 52.8 % (ref 50–65)
PLATELET # BLD AUTO: 268 K/UL (ref 130–400)
PMV BLD AUTO: 10.7 FL (ref 9.4–12.4)
POTASSIUM SERPL-SCNC: 4.2 MMOL/L (ref 3.5–5)
PROT SERPL-MCNC: 7.6 G/DL (ref 6.4–8.3)
RBC # BLD AUTO: 5.9 M/UL (ref 4.7–6.1)
SODIUM SERPL-SCNC: 141 MMOL/L (ref 136–145)
TRIGL SERPL-MCNC: 108 MG/DL (ref 0–149)
TSH SERPL DL<=0.005 MIU/L-ACNC: 1.97 UIU/ML (ref 0.27–4.2)
VIT B12 SERPL-MCNC: 1089 PG/ML (ref 232–1245)
WBC # BLD AUTO: 8.3 K/UL (ref 4.8–10.8)

## 2024-12-16 PROCEDURE — 99214 OFFICE O/P EST MOD 30 MIN: CPT | Performed by: NURSE PRACTITIONER

## 2024-12-16 PROCEDURE — 3044F HG A1C LEVEL LT 7.0%: CPT | Performed by: NURSE PRACTITIONER

## 2024-12-16 RX ORDER — SILDENAFIL CITRATE 20 MG/1
TABLET ORAL
Qty: 90 TABLET | Refills: 1 | Status: SHIPPED | OUTPATIENT
Start: 2024-12-16

## 2024-12-16 SDOH — HEALTH STABILITY - MENTAL HEALTH: OTHER PHYSICAL AND MENTAL STRAIN RELATED TO WORK: Z56.6

## 2024-12-16 NOTE — PROGRESS NOTES
Father         kidney stone      Social History     Socioeconomic History    Marital status:      Spouse name: Not on file    Number of children: Not on file    Years of education: Not on file    Highest education level: Not on file   Occupational History    Not on file   Tobacco Use    Smoking status: Never    Smokeless tobacco: Never   Substance and Sexual Activity    Alcohol use: No    Drug use: No    Sexual activity: Yes     Partners: Female   Other Topics Concern    Not on file   Social History Narrative    Not on file     Social Determinants of Health     Financial Resource Strain: Low Risk  (7/10/2024)    Overall Financial Resource Strain (CARDIA)     Difficulty of Paying Living Expenses: Not hard at all   Food Insecurity: No Food Insecurity (7/10/2024)    Hunger Vital Sign     Worried About Running Out of Food in the Last Year: Never true     Ran Out of Food in the Last Year: Never true   Transportation Needs: No Transportation Needs (7/10/2024)    PRAPARE - Transportation     Lack of Transportation (Medical): No     Lack of Transportation (Non-Medical): No   Physical Activity: Not on file   Stress: Not on file   Social Connections: Unknown (10/11/2023)    [facility]    Family and Community Support     Help with Day-to-Day Activities: Not on file     Lonely or Isolated: Not on file   Intimate Partner Violence: Unknown (10/11/2023)    [facility]    Abuse Screen     Unsafe at Home or Work/School: Not on file     Feels Threatened by Someone?: Not on file     Does Anyone Keep You from Contacting Others or Doint Things Outside the Home?: Not on file     Physical Sign of Abuse Present: Not on file   Housing Stability: Low Risk  (7/10/2024)    Housing Stability Vital Sign     Unable to Pay for Housing in the Last Year: No     Number of Places Lived in the Last Year: 1     Unstable Housing in the

## 2024-12-17 DIAGNOSIS — E78.5 HYPERLIPIDEMIA, UNSPECIFIED HYPERLIPIDEMIA TYPE: ICD-10-CM

## 2024-12-17 DIAGNOSIS — Z51.81 ENCOUNTER FOR MEDICATION MONITORING: ICD-10-CM

## 2024-12-17 DIAGNOSIS — E78.5 HYPERLIPIDEMIA, UNSPECIFIED HYPERLIPIDEMIA TYPE: Primary | ICD-10-CM

## 2024-12-17 RX ORDER — ATORVASTATIN CALCIUM 20 MG/1
20 TABLET, FILM COATED ORAL NIGHTLY
Qty: 30 TABLET | Refills: 3 | Status: SHIPPED | OUTPATIENT
Start: 2024-12-17

## 2024-12-17 NOTE — TELEPHONE ENCOUNTER
Ruben Marie called to request a refill on his medication.      Last office visit : 12/16/2024   Next office visit : Visit date not found     Requested Prescriptions     Pending Prescriptions Disp Refills    atorvastatin (LIPITOR) 20 MG tablet 30 tablet 3     Sig: Take 1 tablet by mouth nightly            Olga Soler LPN

## 2024-12-18 ASSESSMENT — ENCOUNTER SYMPTOMS
GASTROINTESTINAL NEGATIVE: 1
RESPIRATORY NEGATIVE: 1

## 2024-12-20 DIAGNOSIS — E78.5 HYPERLIPIDEMIA, UNSPECIFIED HYPERLIPIDEMIA TYPE: ICD-10-CM

## 2024-12-23 RX ORDER — ATORVASTATIN CALCIUM 20 MG/1
20 TABLET, FILM COATED ORAL NIGHTLY
Qty: 90 TABLET | Refills: 1 | OUTPATIENT
Start: 2024-12-23

## 2025-02-26 DIAGNOSIS — F41.9 ANXIETY: ICD-10-CM

## 2025-02-26 DIAGNOSIS — Z56.6 STRESS AT WORK: ICD-10-CM

## 2025-02-26 SDOH — HEALTH STABILITY - MENTAL HEALTH: OTHER PHYSICAL AND MENTAL STRAIN RELATED TO WORK: Z56.6

## 2025-02-26 NOTE — TELEPHONE ENCOUNTER
Ruben Marie called to request a refill on his medication.      Last office visit : 12/16/2024  Next office visit : Visit date not found     Requested Prescriptions     Pending Prescriptions Disp Refills    sertraline (ZOLOFT) 50 MG tablet [Pharmacy Med Name: SERTRALINE HCL 50 MG TABLET] 90 tablet 0     Sig: TAKE 1 TABLET BY MOUTH EVERY DAY WITH FOOD            Olga Soler LPN

## 2025-04-08 ENCOUNTER — OFFICE VISIT (OUTPATIENT)
Age: 55
End: 2025-04-08
Payer: COMMERCIAL

## 2025-04-08 VITALS
OXYGEN SATURATION: 97 % | RESPIRATION RATE: 16 BRPM | HEIGHT: 73 IN | SYSTOLIC BLOOD PRESSURE: 122 MMHG | HEART RATE: 64 BPM | DIASTOLIC BLOOD PRESSURE: 80 MMHG | WEIGHT: 260.4 LBS | TEMPERATURE: 97.1 F | BODY MASS INDEX: 34.51 KG/M2

## 2025-04-08 DIAGNOSIS — E78.5 HYPERLIPIDEMIA, UNSPECIFIED HYPERLIPIDEMIA TYPE: ICD-10-CM

## 2025-04-08 DIAGNOSIS — E11.9 TYPE 2 DIABETES MELLITUS WITHOUT COMPLICATION, WITHOUT LONG-TERM CURRENT USE OF INSULIN: Primary | ICD-10-CM

## 2025-04-08 DIAGNOSIS — F41.9 ANXIETY: ICD-10-CM

## 2025-04-08 DIAGNOSIS — E11.9 TYPE 2 DIABETES MELLITUS WITHOUT COMPLICATION, WITHOUT LONG-TERM CURRENT USE OF INSULIN: ICD-10-CM

## 2025-04-08 DIAGNOSIS — Z56.6 STRESS AT WORK: ICD-10-CM

## 2025-04-08 DIAGNOSIS — M25.50 ARTHRALGIA, UNSPECIFIED JOINT: ICD-10-CM

## 2025-04-08 DIAGNOSIS — M25.511 PAIN IN JOINT OF RIGHT SHOULDER: ICD-10-CM

## 2025-04-08 LAB
ALBUMIN SERPL-MCNC: 4.5 G/DL (ref 3.5–5.2)
ALP SERPL-CCNC: 89 U/L (ref 40–129)
ALT SERPL-CCNC: 17 U/L (ref 10–50)
ANION GAP SERPL CALCULATED.3IONS-SCNC: 12 MMOL/L (ref 8–16)
AST SERPL-CCNC: 17 U/L (ref 10–50)
BILIRUB SERPL-MCNC: 0.8 MG/DL (ref 0.2–1.2)
BILIRUB UR QL STRIP: NEGATIVE
BUN SERPL-MCNC: 18 MG/DL (ref 6–20)
CALCIUM SERPL-MCNC: 9.9 MG/DL (ref 8.6–10)
CHLORIDE SERPL-SCNC: 101 MMOL/L (ref 98–107)
CHOLEST SERPL-MCNC: 139 MG/DL (ref 0–199)
CLARITY UR: CLEAR
CO2 SERPL-SCNC: 25 MMOL/L (ref 22–29)
COLOR UR: YELLOW
CREAT SERPL-MCNC: 0.9 MG/DL (ref 0.7–1.2)
CREAT UR-MCNC: 249 MG/DL (ref 39–259)
GLUCOSE SERPL-MCNC: 136 MG/DL (ref 70–99)
GLUCOSE UR STRIP.AUTO-MCNC: NEGATIVE MG/DL
HBA1C MFR BLD: 6.3 % (ref 4–5.6)
HDLC SERPL-MCNC: 37 MG/DL (ref 40–60)
HGB UR STRIP.AUTO-MCNC: NEGATIVE MG/L
KETONES UR STRIP.AUTO-MCNC: NEGATIVE MG/DL
LDLC SERPL CALC-MCNC: 71 MG/DL
LEUKOCYTE ESTERASE UR QL STRIP.AUTO: NEGATIVE
MICROALBUMIN UR-MCNC: <1.2 MG/DL (ref 0–1.99)
MICROALBUMIN/CREAT UR-RTO: NORMAL MG/G (ref 0–29)
NITRITE UR QL STRIP.AUTO: NEGATIVE
PH UR STRIP.AUTO: 5.5 [PH] (ref 5–8)
POTASSIUM SERPL-SCNC: 3.9 MMOL/L (ref 3.5–5.1)
PROT SERPL-MCNC: 7.4 G/DL (ref 6.4–8.3)
PROT UR STRIP.AUTO-MCNC: NEGATIVE MG/DL
SODIUM SERPL-SCNC: 138 MMOL/L (ref 136–145)
SP GR UR STRIP.AUTO: 1.02 (ref 1–1.03)
TRIGL SERPL-MCNC: 155 MG/DL (ref 0–149)
UROBILINOGEN UR STRIP.AUTO-MCNC: 0.2 E.U./DL

## 2025-04-08 PROCEDURE — 99214 OFFICE O/P EST MOD 30 MIN: CPT | Performed by: NURSE PRACTITIONER

## 2025-04-08 RX ORDER — TIRZEPATIDE 7.5 MG/.5ML
7.5 INJECTION, SOLUTION SUBCUTANEOUS WEEKLY
Qty: 6 ML | Refills: 1 | Status: SHIPPED | OUTPATIENT
Start: 2025-04-08

## 2025-04-08 RX ORDER — CELECOXIB 100 MG/1
100 CAPSULE ORAL 2 TIMES DAILY PRN
Qty: 60 CAPSULE | Refills: 2 | Status: SHIPPED | OUTPATIENT
Start: 2025-04-08

## 2025-04-08 RX ORDER — ACYCLOVIR 400 MG/1
TABLET ORAL
Qty: 3 EACH | Refills: 5 | Status: SHIPPED | OUTPATIENT
Start: 2025-04-08

## 2025-04-08 RX ORDER — DICLOFENAC EPOLAMINE 0.01 G/1
1 SYSTEM TOPICAL 2 TIMES DAILY
Qty: 60 PATCH | Refills: 5 | Status: SHIPPED | OUTPATIENT
Start: 2025-04-08

## 2025-04-08 SDOH — HEALTH STABILITY - MENTAL HEALTH: OTHER PHYSICAL AND MENTAL STRAIN RELATED TO WORK: Z56.6

## 2025-04-08 SDOH — ECONOMIC STABILITY: FOOD INSECURITY: WITHIN THE PAST 12 MONTHS, THE FOOD YOU BOUGHT JUST DIDN'T LAST AND YOU DIDN'T HAVE MONEY TO GET MORE.: NEVER TRUE

## 2025-04-08 SDOH — ECONOMIC STABILITY: FOOD INSECURITY: WITHIN THE PAST 12 MONTHS, YOU WORRIED THAT YOUR FOOD WOULD RUN OUT BEFORE YOU GOT MONEY TO BUY MORE.: NEVER TRUE

## 2025-04-08 ASSESSMENT — PATIENT HEALTH QUESTIONNAIRE - PHQ9
SUM OF ALL RESPONSES TO PHQ QUESTIONS 1-9: 0
SUM OF ALL RESPONSES TO PHQ QUESTIONS 1-9: 0
2. FEELING DOWN, DEPRESSED OR HOPELESS: NOT AT ALL
1. LITTLE INTEREST OR PLEASURE IN DOING THINGS: NOT AT ALL
SUM OF ALL RESPONSES TO PHQ QUESTIONS 1-9: 0
SUM OF ALL RESPONSES TO PHQ QUESTIONS 1-9: 0

## 2025-04-08 ASSESSMENT — ENCOUNTER SYMPTOMS
GASTROINTESTINAL NEGATIVE: 1
TROUBLE SWALLOWING: 0
RESPIRATORY NEGATIVE: 1

## 2025-04-08 NOTE — PROGRESS NOTES
Hyperlipidemia, unspecified hyperlipidemia type  E78.5 Comprehensive Metabolic Panel     Lipid Panel      3. Stress at work  Z56.6 Continue sertraline 50mg daily      4. Anxiety  F41.9 Continue sertraline 50mg daily      5. Pain in joint of right shoulder  M25.511 diclofenac (FLECTOR) 1.3 % PTCH patch      6. Arthralgia, unspecified joint  M25.50 diclofenac sodium (VOLTAREN) 1 % GEL     celecoxib (CELEBREX) 100 MG capsule          :    Ruben Marie: Diabetes (Patient is here today for his 3 month diabetes follow up. Patient is fasting this morning. )  Plan as above  Lab today  Further f/u pending lab review and prn.  Diagnosis and orders for this visit are above.      Please note that this chart was generated using dragon dictationsoftware.  Although every effort was made to ensure the accuracy of this automated transcription, some errors in transcription may have occurred.    The patient (or guardian, if applicable) and other individuals in attendance with the patient were advised that Artificial Intelligence will be utilized during this visit to record, process the conversation to generate a clinical note, and support improvement of the AI technology. The patient (or guardian, if applicable) and other individuals in attendance at the appointment consented to the use of AI, including the recording.      --DEVIN Noble on 4/8/2025 at 8:41 AM    An electronic signature was used to authenticate this note.

## 2025-04-14 ENCOUNTER — RESULTS FOLLOW-UP (OUTPATIENT)
Age: 55
End: 2025-04-14

## 2025-06-10 DIAGNOSIS — Z56.6 STRESS AT WORK: ICD-10-CM

## 2025-06-10 DIAGNOSIS — F41.9 ANXIETY: ICD-10-CM

## 2025-06-10 SDOH — HEALTH STABILITY - MENTAL HEALTH: OTHER PHYSICAL AND MENTAL STRAIN RELATED TO WORK: Z56.6

## 2025-06-10 NOTE — TELEPHONE ENCOUNTER
Ruben Marie called to request a refill on his medication.      Last office visit : 4/8/2025  Next office visit : Visit date not found     Requested Prescriptions     Pending Prescriptions Disp Refills    sertraline (ZOLOFT) 50 MG tablet [Pharmacy Med Name: SERTRALINE HCL 50 MG TABLET] 90 tablet 0     Sig: TAKE 1 TABLET BY MOUTH EVERY DAY WITH FOOD            Olga Soler LPN

## 2025-07-08 DIAGNOSIS — M25.50 ARTHRALGIA, UNSPECIFIED JOINT: ICD-10-CM

## 2025-07-08 RX ORDER — CELECOXIB 100 MG/1
100 CAPSULE ORAL 2 TIMES DAILY PRN
Qty: 60 CAPSULE | Refills: 2 | Status: SHIPPED | OUTPATIENT
Start: 2025-07-08

## 2025-07-08 NOTE — TELEPHONE ENCOUNTER
Ruben Marie called to request a refill on his medication.      Last office visit : 4/8/2025  Next office visit : Visit date not found     Requested Prescriptions     Pending Prescriptions Disp Refills    celecoxib (CELEBREX) 100 MG capsule [Pharmacy Med Name: CELECOXIB 100 MG CAPSULE] 60 capsule 2     Sig: TAKE 1 CAPSULE BY MOUTH 2 TIMES DAILY AS NEEDED FOR PAIN.            Olga Soelr LPN

## (undated) DEVICE — MASK,OXYGEN,MED CONC,ADLT,7' TUB, UC: Brand: PENDING

## (undated) DEVICE — Device: Brand: DEFENDO AIR/WATER/SUCTION AND BIOPSY VALVE

## (undated) DEVICE — TBG SMPL FLTR LINE NASL 02/C02 A/ BX/100

## (undated) DEVICE — THE CHANNEL CLEANING BRUSH IS A NYLON FLEXI BRUSH ATTACHED TO A FLEXIBLE PLASTIC SHEATH DESIGNED TO SAFELY REMOVE DEBRIS FROM FLEXIBLE ENDOSCOPES.

## (undated) DEVICE — SNAR POLYP SENSATION MICRO OVL 13 240X40

## (undated) DEVICE — SENSR O2 OXIMAX FNGR A/ 18IN NONSTR

## (undated) DEVICE — THE SINGLE USE ETRAP – POLYP TRAP IS USED FOR SUCTION RETRIEVAL OF ENDOSCOPICALLY REMOVED POLYPS.: Brand: ETRAP

## (undated) DEVICE — YANKAUER,BULB TIP WITH VENT: Brand: ARGYLE

## (undated) DEVICE — CUFF,BP,DISP,1 TUBE,ADULT,HP: Brand: MEDLINE